# Patient Record
Sex: MALE | Race: BLACK OR AFRICAN AMERICAN | NOT HISPANIC OR LATINO | Employment: OTHER | URBAN - METROPOLITAN AREA
[De-identification: names, ages, dates, MRNs, and addresses within clinical notes are randomized per-mention and may not be internally consistent; named-entity substitution may affect disease eponyms.]

---

## 2017-05-27 PROCEDURE — 99283 EMERGENCY DEPT VISIT LOW MDM: CPT

## 2017-05-28 ENCOUNTER — HOSPITAL ENCOUNTER (EMERGENCY)
Facility: OTHER | Age: 57
Discharge: HOME | End: 2017-05-28
Attending: EMERGENCY MEDICINE
Payer: MEDICARE

## 2017-05-28 VITALS
WEIGHT: 218.5 LBS | RESPIRATION RATE: 18 BRPM | SYSTOLIC BLOOD PRESSURE: 192 MMHG | BODY MASS INDEX: 30.59 KG/M2 | TEMPERATURE: 98 F | OXYGEN SATURATION: 99 % | HEART RATE: 76 BPM | HEIGHT: 71 IN | DIASTOLIC BLOOD PRESSURE: 101 MMHG

## 2017-05-28 VITALS
SYSTOLIC BLOOD PRESSURE: 173 MMHG | DIASTOLIC BLOOD PRESSURE: 83 MMHG | HEART RATE: 66 BPM | RESPIRATION RATE: 18 BRPM | OXYGEN SATURATION: 100 % | TEMPERATURE: 98 F

## 2017-05-28 DIAGNOSIS — R07.89 ATYPICAL CHEST PAIN: Primary | ICD-10-CM

## 2017-05-28 DIAGNOSIS — Z99.2 ESRD ON DIALYSIS: ICD-10-CM

## 2017-05-28 DIAGNOSIS — G47.00 INSOMNIA, UNSPECIFIED TYPE: Primary | ICD-10-CM

## 2017-05-28 DIAGNOSIS — R07.9 CHEST PAIN: ICD-10-CM

## 2017-05-28 DIAGNOSIS — N18.6 ESRD ON DIALYSIS: ICD-10-CM

## 2017-05-28 LAB
ALBUMIN SERPL BCP-MCNC: 4.3 G/DL
ALP SERPL-CCNC: 137 U/L
ALT SERPL W/O P-5'-P-CCNC: 30 U/L
ANION GAP SERPL CALC-SCNC: 17 MMOL/L
AST SERPL-CCNC: 21 U/L
BASOPHILS # BLD AUTO: 0.01 K/UL
BASOPHILS NFR BLD: 0.2 %
BILIRUB SERPL-MCNC: 0.5 MG/DL
BUN SERPL-MCNC: 47 MG/DL
CALCIUM SERPL-MCNC: 8.2 MG/DL
CHLORIDE SERPL-SCNC: 100 MMOL/L
CO2 SERPL-SCNC: 20 MMOL/L
CREAT SERPL-MCNC: 12.8 MG/DL
DIFFERENTIAL METHOD: ABNORMAL
EOSINOPHIL # BLD AUTO: 0.2 K/UL
EOSINOPHIL NFR BLD: 4.1 %
ERYTHROCYTE [DISTWIDTH] IN BLOOD BY AUTOMATED COUNT: 13.8 %
EST. GFR  (AFRICAN AMERICAN): 5 ML/MIN/1.73 M^2
EST. GFR  (NON AFRICAN AMERICAN): 4 ML/MIN/1.73 M^2
GLUCOSE SERPL-MCNC: 103 MG/DL
HCT VFR BLD AUTO: 31.6 %
HGB BLD-MCNC: 10.9 G/DL
LYMPHOCYTES # BLD AUTO: 1.3 K/UL
LYMPHOCYTES NFR BLD: 26.2 %
MCH RBC QN AUTO: 30.2 PG
MCHC RBC AUTO-ENTMCNC: 34.5 %
MCV RBC AUTO: 88 FL
MONOCYTES # BLD AUTO: 0.3 K/UL
MONOCYTES NFR BLD: 5.9 %
NEUTROPHILS # BLD AUTO: 3.1 K/UL
NEUTROPHILS NFR BLD: 63.4 %
PLATELET # BLD AUTO: 194 K/UL
PMV BLD AUTO: 10 FL
POTASSIUM SERPL-SCNC: 4.5 MMOL/L
PROT SERPL-MCNC: 8.1 G/DL
RBC # BLD AUTO: 3.61 M/UL
SODIUM SERPL-SCNC: 137 MMOL/L
TROPONIN I SERPL DL<=0.01 NG/ML-MCNC: 0.1 NG/ML
TROPONIN I SERPL DL<=0.01 NG/ML-MCNC: 0.1 NG/ML
WBC # BLD AUTO: 4.88 K/UL

## 2017-05-28 PROCEDURE — 80053 COMPREHEN METABOLIC PANEL: CPT

## 2017-05-28 PROCEDURE — 85025 COMPLETE CBC W/AUTO DIFF WBC: CPT

## 2017-05-28 PROCEDURE — 93010 ELECTROCARDIOGRAM REPORT: CPT | Mod: ,,, | Performed by: INTERNAL MEDICINE

## 2017-05-28 PROCEDURE — 93005 ELECTROCARDIOGRAM TRACING: CPT

## 2017-05-28 PROCEDURE — 99284 EMERGENCY DEPT VISIT MOD MDM: CPT | Mod: 25

## 2017-05-28 PROCEDURE — 84484 ASSAY OF TROPONIN QUANT: CPT | Mod: 91

## 2017-05-28 NOTE — ED NOTES
Pt given d/c instructions to include home care and discussing sleeping problem with his PCP. Pt educated again on importance of developing good sleeping habits. Pt verbalized understanding and had no questions. Pt ambulated with steady gait to d/c window and d/c in stable condition.

## 2017-05-28 NOTE — ED NOTES
Pt sleeping, aroused easily by voice. Pt oriented upon arousal. Denies CP. EKG NSR. VS stable with non labored resp. No toiletry needs at present.

## 2017-05-28 NOTE — ED NOTES
"Pt presents to the ED with c/o "not sleeping in 24 hours" pt reports he went to sleep around 0430 in the morning and woke up around 930 in the morning. Pt reports he has poor sleeping habits and he stays up and watches TV all night and sleeps during day. Pt reports he is a dialysis pt and went to dialysis yesterday. Pt educated on developing good sleeping habits.   "

## 2017-05-28 NOTE — ED TRIAGE NOTES
Pt feels he is in fluid overload with L arm pain. Pt reports MWF dialysis. Pt reports he feels that he has 5L of fluid on him due to his weight. Pt reports elevated BP. Pt reports he spoke to Dr. Workman and was instructed to take another losartan and he took that at 708 this morning. Pt reports L arm pain since last night and denies any injury/truama. Pt denies any SOB or CP.

## 2017-05-28 NOTE — ED PROVIDER NOTES
"Encounter Date: 5/28/2017    SCRIBE #1 NOTE: I, Lucitayudy Orr, am scribing for, and in the presence of,  Dr. Larson I have scribed the entire note.       History     Chief Complaint   Patient presents with    Hypertension     pt reports being here last night for left arm pain and not being able to sleep; pt reports this morning his left arm still hurts and "my pressure is really high"; denies any chest pain     Review of patient's allergies indicates:   Allergen Reactions    Minoxidil Itching, Swelling and Other (See Comments)     " drops blood pressure."      Time seen by provider: 9:35 AM    This is a 56 y.o. male with HTN who presents with complaint of left arm pain that radiates toward the chest. He states that the pain began around 6 AM this morning. He did not sleep well last night, so he came to the ED, where he was told to turn off electronics and lights. The patient states that he still could not sleep until 3:30 AM. He slept until about 6:15 AM and woke up in a "hot sweat" and then L arm pain.  He notes that his pressure typically runs about 160/80 but was higher this morning. He is a dialysis patient. He reports that he feels like he "has extra fluid." He denies SOB, nausea, vomiting, diarrhea, fever, or chills. He had a full dialysis session on Friday with no issues.  His dry weight is 91 kg.        The history is provided by the patient.     Past Medical History:   Diagnosis Date    Cancer     RCC s/p bilateral nephrectomy    Cardiomegaly     Dialysis patient     Encounter for blood transfusion     Glaucoma     H/O colonoscopy     Hyperlipemia     Hypertension     Renal disorder      Past Surgical History:   Procedure Laterality Date    DIALYSIS FISTULA CREATION      in arm and groin on R side     KIDNEY SURGERY      KIDNEY TRANSPLANT  kidney cancer    NEPHRECTOMY Bilateral Right 2009;  Left 2010    RCC both sides     Family History   Problem Relation Age of Onset    Heart disease Mother " "    Heart disease Father     Diabetes Father     Hypertension Father      Social History   Substance Use Topics    Smoking status: Never Smoker    Smokeless tobacco: Not on file    Alcohol use No     Review of Systems   Constitutional: Positive for diaphoresis ("hot sweat"). Negative for chills and fever.   HENT: Negative for congestion, nosebleeds and sore throat.    Eyes: Negative for discharge and itching.   Respiratory: Negative for shortness of breath and wheezing.    Cardiovascular: Positive for chest pain.   Gastrointestinal: Negative for nausea.   Genitourinary: Negative for difficulty urinating and dysuria.   Musculoskeletal: Negative for back pain and neck pain.   Skin: Negative for rash.   Neurological: Positive for numbness ("Tingling" numbness). Negative for dizziness and weakness.   Hematological: Does not bruise/bleed easily.   Psychiatric/Behavioral: Negative for agitation and confusion.       Physical Exam     Initial Vitals [05/28/17 0919]   BP Pulse Resp Temp SpO2   (!) 194/100 75 18 98 °F (36.7 °C) 98 %     Physical Exam    Nursing note and vitals reviewed.  Constitutional: He appears well-developed and well-nourished. He is not diaphoretic. No distress.   HENT:   Head: Normocephalic and atraumatic.   Right Ear: External ear normal.   Left Ear: External ear normal.   Mouth/Throat: Oropharynx is clear and moist.   Eyes: Conjunctivae and EOM are normal. Pupils are equal, round, and reactive to light. Right eye exhibits no discharge. Left eye exhibits no discharge.   Neck: Normal range of motion. Neck supple. No JVD present.   Cardiovascular: Normal rate, regular rhythm, normal heart sounds and intact distal pulses.   2+DP/PT pulses bilaterally   Pulmonary/Chest: Breath sounds normal. No respiratory distress. He has no wheezes. He has no rhonchi. He has no rales.   Abdominal: Soft. Bowel sounds are normal. He exhibits no distension. There is no tenderness. There is no rebound and no guarding. "   Musculoskeletal: Normal range of motion. He exhibits edema. He exhibits no tenderness.   RLE fistula with palpable thrill. Peripheral edema noted.     Neurological: He is alert and oriented to person, place, and time. He has normal strength. No sensory deficit.   Skin: Skin is warm and dry. No rash noted.         ED Course   Procedures  Labs Reviewed   CBC W/ AUTO DIFFERENTIAL - Abnormal; Notable for the following:        Result Value    RBC 3.61 (*)     Hemoglobin 10.9 (*)     Hematocrit 31.6 (*)     All other components within normal limits   COMPREHENSIVE METABOLIC PANEL - Abnormal; Notable for the following:     CO2 20 (*)     BUN, Bld 47 (*)     Creatinine 12.8 (*)     Calcium 8.2 (*)     Alkaline Phosphatase 137 (*)     Anion Gap 17 (*)     eGFR if  5 (*)     eGFR if non  4 (*)     All other components within normal limits   TROPONIN I - Abnormal; Notable for the following:     Troponin I 0.100 (*)     All other components within normal limits   TROPONIN I - Abnormal; Notable for the following:     Troponin I 0.095 (*)     All other components within normal limits     EKG Readings: (Independently Interpreted)   9:47AM:  Rate of 69.  NSR.  L axis deviation.  LVH.  Normal intervals.  No ST or ischemic changes.           X-Rays:   Independently Interpreted Readings:   Chest X-Ray: 12:00 PM - Enlarged heart. Increased interstitial markings. Otherwise, no infiltrate, effusion, or edema.      Imaging Results          X-Ray Chest PA And Lateral (Final result)  Result time 05/28/17 10:05:58    Final result by Sariah Santana MD (05/28/17 10:05:58)                 Impression:      Suspected mild vascular congestive changes as above.              Electronically signed by: SARIAH SANTANA MD  Date:     05/28/17  Time:    10:05              Narrative:    Comparison: 12/3/2011    Technique: PA and lateral views of the chest was obtained    Findings: The cardiac silhouette is enlarged.    Pulmonary vasculature appears mildly congested without evidence of perico pulmonary edema.  No parenchymal consolidations or pleural effusions visualized. Visualized osseous structures demonstrate no acute abnormality.                              Medical Decision Making:   History:   Old Medical Records: I decided to obtain old medical records.  Old Records Summarized: records from clinic visits, other records and records from previous admission(s).  Initial Assessment:   9:35AM:  Pt is a 57 y/o M who presents to ED with elevated BP along with L arm pain radiating to his chest.  Pt appears well, nontoxic.  He does have ESRD, HTN, HLD.  Will plan for labs, EKG, CXR, will continue to follow and reassess.    Independently Interpreted Test(s):   I have ordered and independently interpreted X-rays - see prior notes.  I have ordered and independently interpreted EKG Reading(s) - see prior notes  Clinical Tests:   Lab Tests: Ordered and Reviewed  Radiological Study: Ordered and Reviewed  Medical Tests: Ordered and Reviewed    Additional MDM:   EKG: I have independently interpreted EKG(s) - see notes.   X-Rays: I have independently interpreted X-Ray(s) - see notes.     1:32 PM:  Pt doing well.  His troponin was slightly elevated initially, though I suspect that this is likely due to her ESRD, but his repeat troponin remains stable.  I have a low suspicion that this is cardiac in nature and based on his labs and CXR, he does not need to be emergently dialyzed at this time and can wait till tomorrow.  I counseled pt regarding supportive care measures.  I have discussed with the pt ED return warnings and need for close PCP f/u.  Pt agreeable to plan and all questions answered.  I feel that pt is stable for discharge and management as an outpatient and no further intervention is needed at this time.  Pt is comfortable returning to the ED if needed.  Will DC home in stable condition.              Scribe Attestation:   Scribe  #1: I performed the above scribed service and the documentation accurately describes the services I performed. I attest to the accuracy of the note.    Attending Attestation:           Physician Attestation for Scribe:  Physician Attestation Statement for Scribe #1: I, Dr. Simmons, reviewed documentation, as scribed by Lucita Orr in my presence, and it is both accurate and complete.                 ED Course     Clinical Impression:     1. Atypical chest pain    2. Chest pain    3. ESRD on dialysis                Shelly Simmons MD  05/28/17 1533       Shelly Simmons MD  05/28/17 1535

## 2017-05-28 NOTE — DISCHARGE INSTRUCTIONS
Follow up with Dr. Workman.    Please return to the ER if you have chest pain, difficulty breathing, fevers, altered mental status, dizziness, weakness, or any other concerns.      Follow up with your primary care physician.

## 2017-05-28 NOTE — ED PROVIDER NOTES
"Encounter Date: 5/27/2017    SCRIBE #1 NOTE: I, Hodan Fink , am scribing for, and in the presence of, Dr. Navarrete.       History     Chief Complaint   Patient presents with    Insomnia     pt reports he hasn't slept in 2 nights, reported he only took Tylenol and "doesn't take that other stuff", and also reports "carpa tunnel" again to left forearm     Review of patient's allergies indicates:   Allergen Reactions    Minoxidil Itching, Swelling and Other (See Comments)     " drops blood pressure."      Time seen by provider: 12:15 AM    This is a 56 y.o. male who presents with complaint of insomnia. Symptoms began two days ago. He stays awake throughout the night watching television. Pt has no other complaints, but denies fever, chills, nausea, vomiting, abdominal pain, myalgias, or weakness. Symptoms are described as constant. Pt is able to sleep during the day, and denies any alleviating factors. He previously followed up with PCP for the same symptoms. Pt is dialyzed every Monday, Wednesday, and Friday. He denies use of tobacco, alcohol, or illicit drugs.         The history is provided by the patient.     Past Medical History:   Diagnosis Date    Cancer     RCC s/p bilateral nephrectomy    Cardiomegaly     Dialysis patient     Encounter for blood transfusion     Glaucoma     H/O colonoscopy     Hyperlipemia     Hypertension     Renal disorder      Past Surgical History:   Procedure Laterality Date    DIALYSIS FISTULA CREATION      in arm and groin on R side     KIDNEY SURGERY      KIDNEY TRANSPLANT  kidney cancer    NEPHRECTOMY Bilateral Right 2009;  Left 2010    RCC both sides     Family History   Problem Relation Age of Onset    Heart disease Mother     Heart disease Father     Diabetes Father     Hypertension Father      Social History   Substance Use Topics    Smoking status: Never Smoker    Smokeless tobacco: Not on file    Alcohol use No     Review of Systems   Constitutional: Negative " for fever.   HENT: Negative for sore throat.    Respiratory: Negative for shortness of breath.    Cardiovascular: Negative for chest pain.   Gastrointestinal: Negative for nausea.   Genitourinary: Negative for dysuria.   Musculoskeletal: Negative for back pain.   Skin: Negative for rash.   Neurological: Negative for weakness.   Hematological: Does not bruise/bleed easily.   Psychiatric/Behavioral: Negative for sleep disturbance.        Positive for insomnia.        Physical Exam     Initial Vitals [05/28/17 0003]   BP Pulse Resp Temp SpO2   (!) 192/101 76 18 98.1 °F (36.7 °C) 99 %     Physical Exam    Nursing note and vitals reviewed.  Constitutional: He appears well-developed and well-nourished. He is not diaphoretic. No distress.   HENT:   Head: Normocephalic and atraumatic.   Right Ear: External ear normal.   Left Ear: External ear normal.   Eyes: EOM are normal. Pupils are equal, round, and reactive to light. Right eye exhibits no discharge. Left eye exhibits no discharge.   Neck: Normal range of motion.   Cardiovascular: Normal rate, regular rhythm and normal heart sounds. Exam reveals no gallop and no friction rub.    No murmur heard.  Pulmonary/Chest: Breath sounds normal. No respiratory distress. He has no wheezes. He has no rhonchi. He has no rales.   Abdominal: Soft. There is no tenderness. There is no rebound and no guarding.   Musculoskeletal: Normal range of motion. He exhibits no edema or tenderness.   Neurological: He is alert and oriented to person, place, and time.   Cranial nerves II through XII grossly intact.  5/5 motor strength all 4 extremities.  Sensation is normal.  Finger to nose normal.  Gait normal.  Speech and cognition is normal.  No focal neurologic deficit.   Skin: Skin is warm and dry. No rash and no abscess noted. No erythema. No pallor.   Psychiatric: He has a normal mood and affect. His behavior is normal. Judgment and thought content normal.         ED Course   Procedures  Labs  Reviewed - No data to display          Medical Decision Making:   ED Management:  Patient presents complaining his having difficulty sleeping for 2 nights in a row.  Denies any significant stress other than some plumbing problems he's been having at his house.  Denies any medical issues.  Has been compliant with dialysis.  He feels physically fine.  He reports he does try to get asleep watching television.  I counseled him on appropriate sleep hygiene.  He reports he previously down benefit from a pharmaceutical sleep aid.  I recommended he follow-up with primary care discuss with him the appropriate to receive this again.  No signs of any suicidality or homicidality or anything else.     I did have an extensive talk regarding signs to return for and need for follow up. Patient expressed understanding and will monitor symptoms closely and follow-up as needed.    ROSALBA Navarrete M.D.  05/28/2017  1:12 AM              Scribe Attestation:   Scribe #1: I performed the above scribed service and the documentation accurately describes the services I performed. I attest to the accuracy of the note.    Attending Attestation:           Physician Attestation for Scribe:  Physician Attestation Statement for Scribe #1: I, Dr. Navarrete, reviewed documentation, as scribed by Hodan Fink  in my presence, and it is both accurate and complete.                 ED Course     Clinical Impression:     1. Insomnia, unspecified type                Andrea Navarrete MD  05/28/17 0112

## 2017-05-29 ENCOUNTER — HOSPITAL ENCOUNTER (EMERGENCY)
Facility: OTHER | Age: 57
Discharge: HOME | End: 2017-05-29
Attending: EMERGENCY MEDICINE
Payer: MEDICARE

## 2017-05-29 VITALS
WEIGHT: 220 LBS | SYSTOLIC BLOOD PRESSURE: 155 MMHG | DIASTOLIC BLOOD PRESSURE: 78 MMHG | RESPIRATION RATE: 16 BRPM | HEART RATE: 80 BPM | HEIGHT: 71 IN | BODY MASS INDEX: 30.8 KG/M2 | OXYGEN SATURATION: 98 % | TEMPERATURE: 98 F

## 2017-05-29 DIAGNOSIS — F32.A DEPRESSION, UNSPECIFIED DEPRESSION TYPE: Primary | ICD-10-CM

## 2017-05-29 PROCEDURE — 99283 EMERGENCY DEPT VISIT LOW MDM: CPT

## 2017-05-29 NOTE — ED PROVIDER NOTES
"Encounter Date: 5/29/2017    SCRIBE #1 NOTE: I, Lucita Orr, am scribing for, and in the presence of,  Dr. Larson I have scribed the entire note.       History     Chief Complaint   Patient presents with    Depression     Pt is crying and reports feelings of depression since yesterday. "When I get around a lot of people, I'm crying." Dialyzed today. Headache from crying so much. Denies SI/HI. Denies chest pain, SOB.      Review of patient's allergies indicates:   Allergen Reactions    Minoxidil Itching, Swelling and Other (See Comments)     " drops blood pressure."      Time seen by provider: 3:24 PM    This is a 56 y.o. male dialysis patient who presents with complaint of depression. He states that he has been struggling with depression for many years, but he has not seen a doctor about the depression since 2005. The patient states that his head sometimes feels "tight," which he says makes him depressed, but he feels better when his blood pressure is not high. He denies SI or HI, and he states that he feels safe going home. He also denies numbness, weakness, SOB, chest pain, nausea, vomiting, diarrhea, fever, or chills. His wife is a , but she has advised him to talk to other providers. He notes that he will be seeing a  tomorrow. He reports that eating a poor diet caused pain that "went to the brain" and that the "fluid came on" him, but he began eating a better diet starting today.  He did get dialyzed today and had fluid removed.   He reports that he was concerned about his blood pressure and wanted to get it checked in the ED.      The history is provided by the patient.     Past Medical History:   Diagnosis Date    Cancer     RCC s/p bilateral nephrectomy    Cardiomegaly     Dialysis patient     Encounter for blood transfusion     Glaucoma     H/O colonoscopy     Hyperlipemia     Hypertension     Renal disorder      Past Surgical History:   Procedure Laterality Date    " DIALYSIS FISTULA CREATION      in arm and groin on R side     KIDNEY SURGERY      KIDNEY TRANSPLANT  kidney cancer    NEPHRECTOMY Bilateral Right 2009;  Left 2010    RCC both sides     Family History   Problem Relation Age of Onset    Heart disease Mother     Heart disease Father     Diabetes Father     Hypertension Father      Social History   Substance Use Topics    Smoking status: Never Smoker    Smokeless tobacco: Not on file    Alcohol use No     Review of Systems   Constitutional: Negative for chills and fever.   HENT: Negative for congestion, nosebleeds and sore throat.    Eyes: Negative for discharge and itching.   Respiratory: Negative for shortness of breath and wheezing.    Cardiovascular: Negative for chest pain.   Gastrointestinal: Negative for diarrhea, nausea and vomiting.   Musculoskeletal: Negative for back pain and neck pain.   Skin: Negative for rash.   Neurological: Negative for dizziness, weakness, numbness and headaches.   Hematological: Does not bruise/bleed easily.   Psychiatric/Behavioral: Positive for dysphoric mood. Negative for hallucinations, self-injury and suicidal ideas.       Physical Exam     Initial Vitals [05/29/17 1502]   BP Pulse Resp Temp SpO2   (!) 158/81 80 16 98.3 °F (36.8 °C) 98 %     Physical Exam    Nursing note and vitals reviewed.  Constitutional: Vital signs are normal. He appears well-developed and well-nourished. He is not diaphoretic. He does not appear ill. No distress.   HENT:   Head: Normocephalic and atraumatic.   Right Ear: External ear normal.   Left Ear: External ear normal.   Mouth/Throat: Oropharynx is clear and moist.   Eyes: Conjunctivae and EOM are normal. Right eye exhibits no discharge. Left eye exhibits no discharge.   Neck: Normal range of motion. Neck supple.   Cardiovascular: Normal rate, regular rhythm, S1 normal, S2 normal, normal heart sounds and intact distal pulses.   Pulmonary/Chest: Breath sounds normal. No respiratory distress. He  has no wheezes. He has no rhonchi. He has no rales.   Musculoskeletal: Normal range of motion. He exhibits no edema or tenderness.   Neurological: He is alert and oriented to person, place, and time. He has normal strength. No sensory deficit. He exhibits normal muscle tone. Gait normal.   Skin: Skin is warm and dry. No rash noted.   Psychiatric: Judgment and thought content normal.   Tearful. Good judgment, good insight. Not homicidal. Not suicidal.   No AVH.  Appropriate thought content, normal speech.           ED Course   Procedures  Labs Reviewed - No data to display          Medical Decision Making:   History:   Old Medical Records: I decided to obtain old medical records.  Old Records Summarized: other records and records from clinic visits.  Initial Assessment:   3:24PM:  Pt is a 57 y/o M who presents to ED with depression.  Pt was also concerned about his blood pressure as he admits to a poor diet over the weekend. Pt adamantly denies SI/HI, also is going to talk to a SW tomorrow and his wife is a SW as well.  Pt does feel safe going home to his wife and does agree that he would return if he actually had thoughts of hurting himself or others.  Pt would like to go home and declines any further evaluation at this time, and feels better after getting his BP checked.  I feel that pt is safe to go home and does not require PEC at this time.  He does have good f/u and also follows closely with Dr. Workman.  I counseled pt regarding supportive care measures.  I have discussed with the pt ED return warnings and need for close PCP f/u.  Pt agreeable to plan and all questions answered.  I feel that pt is stable for discharge and management as an outpatient and no further intervention is needed at this time.  Pt is comfortable returning to the ED if needed.  Will DC home in stable condition.                Scribe Attestation:   Scribe #1: I performed the above scribed service and the documentation accurately describes  the services I performed. I attest to the accuracy of the note.    Attending Attestation:           Physician Attestation for Scribe:  Physician Attestation Statement for Scribe #1: I, Dr. Simmons, reviewed documentation, as scribed by Lucita Orr in my presence, and it is both accurate and complete.                 ED Course     Clinical Impression:     1. Depression, unspecified depression type                Shelly Simmons MD  05/29/17 5471

## 2017-05-29 NOTE — ED NOTES
Pt to er with c/o feeling depressed. Dr wallace at bedside. Pt aaox3 eye red. Pt states feels comfortable going  Home. Skin warm and dry. Pt affect appropriate. Pt gait steady.

## 2017-08-01 ENCOUNTER — HOSPITAL ENCOUNTER (EMERGENCY)
Facility: OTHER | Age: 57
Discharge: HOME | End: 2017-08-01
Attending: EMERGENCY MEDICINE
Payer: MEDICARE

## 2017-08-01 VITALS
HEART RATE: 76 BPM | TEMPERATURE: 98 F | DIASTOLIC BLOOD PRESSURE: 93 MMHG | SYSTOLIC BLOOD PRESSURE: 158 MMHG | RESPIRATION RATE: 18 BRPM | BODY MASS INDEX: 30.09 KG/M2 | OXYGEN SATURATION: 99 % | HEIGHT: 71 IN | WEIGHT: 214.94 LBS

## 2017-08-01 DIAGNOSIS — S40.022A CONTUSION OF LEFT ARM, INITIAL ENCOUNTER: ICD-10-CM

## 2017-08-01 DIAGNOSIS — S09.90XA HEAD INJURY, INITIAL ENCOUNTER: Primary | ICD-10-CM

## 2017-08-01 DIAGNOSIS — S00.03XA SCALP CONTUSION: ICD-10-CM
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
## 2017-08-01 PROCEDURE — 99283 EMERGENCY DEPT VISIT LOW MDM: CPT

## 2017-08-01 RX ORDER — AMLODIPINE BESYLATE 10 MG/1
10 TABLET ORAL DAILY
COMMUNITY
End: 2023-04-18 | Stop reason: SDUPTHER

## 2017-08-01 NOTE — ED PROVIDER NOTES
"Encounter Date: 8/1/2017       History     Chief Complaint   Patient presents with    Head Injury     Pt fell between the bed and dresser while getting out of bed this AM, CO left arm pain, and right head pain with swelling. Denies LOC.     56-year-old male with history of renal cell carcinoma status post bilateral nephrectomy on dialysis as well as glaucoma, hyperlipidemia, hypertension presents to the emergency department status post fall.  He states that he was having sexual intercourse with his wife when he attempted to disengage when he fell off of his bed around 6 AM.  He states that he struck his head either on the dresser, floor or his bed.  He denies LOC, confusion, nausea, vomiting, lightheadedness, dizziness, numbness, weakness, headache.  He admits to taking 2 Tylenol and applying ice to the contusion to his left forearm.  He also reports contusion to the right scalp.  He states that his wife was concerned so he came to the emergency department for further evaluation and treatment.      The history is provided by the patient.     Review of patient's allergies indicates:   Allergen Reactions    Minoxidil Itching, Swelling and Other (See Comments)     " drops blood pressure."      Past Medical History:   Diagnosis Date    Cancer     RCC s/p bilateral nephrectomy    Cardiomegaly     Dialysis patient     Encounter for blood transfusion     Glaucoma     H/O colonoscopy     Hyperlipemia     Hypertension     Renal disorder      Past Surgical History:   Procedure Laterality Date    DIALYSIS FISTULA CREATION      in arm and groin on R side     KIDNEY SURGERY      KIDNEY TRANSPLANT  kidney cancer    NEPHRECTOMY Bilateral Right 2009;  Left 2010    RCC both sides     Family History   Problem Relation Age of Onset    Heart disease Mother     Heart disease Father     Diabetes Father     Hypertension Father      Social History   Substance Use Topics    Smoking status: Never Smoker    Smokeless " tobacco: Never Used    Alcohol use No     Review of Systems   Constitutional: Negative for chills and fever.   HENT: Negative for sore throat.    Eyes: Negative for photophobia and visual disturbance.   Respiratory: Negative for shortness of breath.    Cardiovascular: Negative for chest pain.   Gastrointestinal: Negative for nausea and vomiting.   Genitourinary: Negative for dysuria.   Musculoskeletal: Negative for back pain, neck pain and neck stiffness.        Contusion left forearm and scalp   Skin: Negative for rash.   Neurological: Negative for weakness and headaches.   Hematological: Does not bruise/bleed easily.   Psychiatric/Behavioral: Negative for confusion.       Physical Exam     Initial Vitals [08/01/17 0826]   BP Pulse Resp Temp SpO2   (!) 158/93 76 18 97.6 °F (36.4 °C) 99 %      MAP       114.67         Physical Exam    Nursing note and vitals reviewed.  Constitutional: He appears well-developed and well-nourished. He is not diaphoretic.  Non-toxic appearance. No distress.   HENT:   Head: Normocephalic. Head is with contusion. Head is without raccoon's eyes, without Schneider's sign, without abrasion and without laceration.       Right Ear: External ear normal.   Left Ear: External ear normal.   Nose: Nose normal. No nasal deformity, septal deviation or nasal septal hematoma.   Mouth/Throat: Uvula is midline, oropharynx is clear and moist and mucous membranes are normal.   Eyes: Conjunctivae and lids are normal. Pupils are equal, round, and reactive to light. Right conjunctiva is not injected. Right conjunctiva has no hemorrhage. Left conjunctiva is not injected. Left conjunctiva has no hemorrhage. No scleral icterus. Right eye exhibits normal extraocular motion and no nystagmus. Left eye exhibits normal extraocular motion and no nystagmus. Right pupil is round. Left pupil is round. Pupils are equal.   Neck: Normal range of motion and phonation normal. Neck supple. No spinous process tenderness and no  muscular tenderness present. Normal range of motion present. No neck rigidity.   Cardiovascular: Normal rate, regular rhythm, normal heart sounds, intact distal pulses and normal pulses. Exam reveals no gallop, no friction rub and no decreased pulses.    No murmur heard.  Pulses:       Radial pulses are 2+ on the right side, and 2+ on the left side.        Dorsalis pedis pulses are 2+ on the right side, and 2+ on the left side.   Pulmonary/Chest: Effort normal and breath sounds normal. No respiratory distress. He has no decreased breath sounds. He has no wheezes. He has no rhonchi. He has no rales.   Abdominal: Soft. Normal appearance and bowel sounds are normal. There is no tenderness. There is no rebound and no guarding.   Musculoskeletal: Normal range of motion.   No obvious deformities, moving all extremities, normal gait  Midline tenderness palpation or step-offs of the cervical, thoracic or lumbar spine.  Intact distal pulses and no sensory deficits.  Capillary refill less than 3 seconds.  No bony deformity or bony tenderness palpation.  Contusion noted to the left forearm with small overlying abrasion.  Full range of motion of the left wrist and elbow.   strength 5 out of 5.  Contusion noted to the right scalp with no bony tenderness palpation or crepitus.  Strength 5 out of 5 bilateral upper and lower extremities.  Full range of motion of extremities.   Neurological: He is alert and oriented to person, place, and time. He has normal strength and normal reflexes. He displays no atrophy. No cranial nerve deficit or sensory deficit. He exhibits normal muscle tone. Coordination and gait normal. GCS eye subscore is 4. GCS verbal subscore is 5. GCS motor subscore is 6.   Negative pronator drift, normal rapid movements   Skin: Skin is warm and dry. Abrasion noted. No bruising, no ecchymosis, no laceration, no lesion and no rash noted. No erythema.        Fistula to the right thigh with palpable thrill    Psychiatric: He has a normal mood and affect. His speech is normal and behavior is normal. Judgment normal. Cognition and memory are normal.         ED Course   Procedures  Labs Reviewed - No data to display          Medical Decision Making:   History:   Old Medical Records: I decided to obtain old medical records.  Initial Assessment:   56-year-old male with complaints consistent with contusion to the scalp and left forearm status post fall from bed.  Afebrile neurovascular intact.  He is alert, healthy and nontoxic appearing.  He is in no apparent distress.  No focal neurological deficits.  No signs of fracture dislocation.  Contusion noted to the right scalp and left forearm  ED Management:  I do not suspect intracranial hemorrhage, mass or lesion at this time.  I doubt concussion.  Feel that emergent imaging is indicated at this time.  Patient given strict return precautions for any worsening symptoms.  He states understanding.  He is stable will be discharged home with care instructions.  He is to follow-up with his doctor the next 48 hours or return for any worsening signs or symptoms.  He states understanding.  This patient was discussed with the attending physician who agrees with treatment plan  Other:   I have discussed this case with another health care provider.       <> Summary of the Discussion: Franco  This note was created using Dragon Medical dictation.  There may be typographical errors secondary to dictation.                     ED Course     Clinical Impression:     1. Head injury, initial encounter    2. Scalp contusion    3. Contusion of left arm, initial encounter    4. Fall, initial encounter          Disposition:   Disposition: Discharged  Condition: Stable                        Linda Britton PA-C  08/01/17 0931

## 2017-08-01 NOTE — ED NOTES
"Pt reports falling between the dresser and his bed this morning after "making love to my wife." Has hematoma to right scalp. Takes baby ASA daily. Took 2 Tylenol today before coming to ER. Denies LOC, HA. Ambulatory. Also has hematoma to left forearm. Palpable radial pulse present. NAD. Will cont to monitor.  "

## 2017-08-14 PROBLEM — E66.9 MILD OBESITY: Status: ACTIVE | Noted: 2017-08-14

## 2021-01-14 ENCOUNTER — IMMUNIZATION (OUTPATIENT)
Dept: INTERNAL MEDICINE | Facility: CLINIC | Age: 61
End: 2021-01-14
Payer: MEDICARE

## 2021-01-14 DIAGNOSIS — Z23 NEED FOR VACCINATION: ICD-10-CM

## 2021-01-14 PROCEDURE — 91300 COVID-19, MRNA, LNP-S, PF, 30 MCG/0.3 ML DOSE VACCINE: CPT | Mod: PBBFAC

## 2021-02-04 ENCOUNTER — IMMUNIZATION (OUTPATIENT)
Dept: INTERNAL MEDICINE | Facility: CLINIC | Age: 61
End: 2021-02-04
Payer: MEDICARE

## 2021-02-04 DIAGNOSIS — Z23 NEED FOR VACCINATION: Primary | ICD-10-CM

## 2021-02-04 PROCEDURE — 91300 COVID-19, MRNA, LNP-S, PF, 30 MCG/0.3 ML DOSE VACCINE: CPT | Mod: PBBFAC

## 2021-02-04 PROCEDURE — 0002A COVID-19, MRNA, LNP-S, PF, 30 MCG/0.3 ML DOSE VACCINE: CPT | Mod: PBBFAC

## 2021-03-18 ENCOUNTER — PATIENT MESSAGE (OUTPATIENT)
Dept: RESEARCH | Facility: HOSPITAL | Age: 61
End: 2021-03-18

## 2021-03-26 ENCOUNTER — PATIENT MESSAGE (OUTPATIENT)
Dept: RESEARCH | Facility: HOSPITAL | Age: 61
End: 2021-03-26

## 2021-04-19 ENCOUNTER — OFFICE VISIT (OUTPATIENT)
Dept: CARDIOLOGY | Facility: CLINIC | Age: 61
End: 2021-04-19
Attending: INTERNAL MEDICINE
Payer: MEDICARE

## 2021-04-19 VITALS
WEIGHT: 205.5 LBS | HEIGHT: 71 IN | BODY MASS INDEX: 28.77 KG/M2 | SYSTOLIC BLOOD PRESSURE: 127 MMHG | DIASTOLIC BLOOD PRESSURE: 59 MMHG | HEART RATE: 61 BPM

## 2021-04-19 DIAGNOSIS — N18.6 END STAGE KIDNEY DISEASE: ICD-10-CM

## 2021-04-19 DIAGNOSIS — E78.00 HYPERCHOLESTEROLEMIA: ICD-10-CM

## 2021-04-19 DIAGNOSIS — E66.3 OVERWEIGHT: ICD-10-CM

## 2021-04-19 DIAGNOSIS — I10 ESSENTIAL HYPERTENSION: ICD-10-CM

## 2021-04-19 PROCEDURE — 93010 ELECTROCARDIOGRAM REPORT: CPT | Mod: ,,, | Performed by: INTERNAL MEDICINE

## 2021-04-19 PROCEDURE — 99999 PR PBB SHADOW E&M-EST. PATIENT-LVL III: CPT | Mod: PBBFAC,,, | Performed by: INTERNAL MEDICINE

## 2021-04-19 PROCEDURE — 93005 ELECTROCARDIOGRAM TRACING: CPT

## 2021-04-19 PROCEDURE — 99999 PR PBB SHADOW E&M-EST. PATIENT-LVL III: ICD-10-PCS | Mod: PBBFAC,,, | Performed by: INTERNAL MEDICINE

## 2021-04-19 PROCEDURE — 99214 PR OFFICE/OUTPT VISIT, EST, LEVL IV, 30-39 MIN: ICD-10-PCS | Mod: S$PBB,25,, | Performed by: INTERNAL MEDICINE

## 2021-04-19 PROCEDURE — 93005 ELECTROCARDIOGRAM TRACING: CPT | Mod: PBBFAC | Performed by: INTERNAL MEDICINE

## 2021-04-19 PROCEDURE — 99213 OFFICE O/P EST LOW 20 MIN: CPT | Mod: PBBFAC,25 | Performed by: INTERNAL MEDICINE

## 2021-04-19 PROCEDURE — 93010 EKG 12-LEAD: ICD-10-PCS | Mod: ,,, | Performed by: INTERNAL MEDICINE

## 2021-04-19 PROCEDURE — 99214 OFFICE O/P EST MOD 30 MIN: CPT | Mod: S$PBB,25,, | Performed by: INTERNAL MEDICINE

## 2021-10-19 ENCOUNTER — OFFICE VISIT (OUTPATIENT)
Dept: CARDIOLOGY | Facility: CLINIC | Age: 61
End: 2021-10-19
Attending: INTERNAL MEDICINE
Payer: MEDICARE

## 2021-10-19 VITALS
HEART RATE: 55 BPM | WEIGHT: 207.56 LBS | OXYGEN SATURATION: 99 % | SYSTOLIC BLOOD PRESSURE: 136 MMHG | DIASTOLIC BLOOD PRESSURE: 84 MMHG | BODY MASS INDEX: 29.06 KG/M2 | HEIGHT: 71 IN

## 2021-10-19 DIAGNOSIS — Z99.2 ANEMIA IN CHRONIC KIDNEY DISEASE, ON CHRONIC DIALYSIS: ICD-10-CM

## 2021-10-19 DIAGNOSIS — E66.3 OVERWEIGHT: ICD-10-CM

## 2021-10-19 DIAGNOSIS — D63.1 ANEMIA IN CHRONIC KIDNEY DISEASE, ON CHRONIC DIALYSIS: ICD-10-CM

## 2021-10-19 DIAGNOSIS — E78.00 HYPERCHOLESTEROLEMIA: ICD-10-CM

## 2021-10-19 DIAGNOSIS — I15.0 RENOVASCULAR HYPERTENSION: ICD-10-CM

## 2021-10-19 DIAGNOSIS — N18.6 END STAGE KIDNEY DISEASE: ICD-10-CM

## 2021-10-19 DIAGNOSIS — N18.6 ANEMIA IN CHRONIC KIDNEY DISEASE, ON CHRONIC DIALYSIS: ICD-10-CM

## 2021-10-19 PROCEDURE — 99214 OFFICE O/P EST MOD 30 MIN: CPT | Mod: S$PBB,,, | Performed by: INTERNAL MEDICINE

## 2021-10-19 PROCEDURE — 99214 PR OFFICE/OUTPT VISIT, EST, LEVL IV, 30-39 MIN: ICD-10-PCS | Mod: S$PBB,,, | Performed by: INTERNAL MEDICINE

## 2021-10-19 PROCEDURE — 99999 PR PBB SHADOW E&M-EST. PATIENT-LVL III: ICD-10-PCS | Mod: PBBFAC,,, | Performed by: INTERNAL MEDICINE

## 2021-10-19 PROCEDURE — 99213 OFFICE O/P EST LOW 20 MIN: CPT | Mod: PBBFAC | Performed by: INTERNAL MEDICINE

## 2021-10-19 PROCEDURE — 99999 PR PBB SHADOW E&M-EST. PATIENT-LVL III: CPT | Mod: PBBFAC,,, | Performed by: INTERNAL MEDICINE

## 2021-10-19 RX ORDER — CLOPIDOGREL BISULFATE 75 MG/1
75 TABLET ORAL DAILY
COMMUNITY
End: 2023-04-18

## 2021-12-08 ENCOUNTER — IMMUNIZATION (OUTPATIENT)
Dept: INTERNAL MEDICINE | Facility: CLINIC | Age: 61
End: 2021-12-08
Payer: MEDICARE

## 2021-12-08 DIAGNOSIS — Z23 NEED FOR VACCINATION: Primary | ICD-10-CM

## 2021-12-08 PROCEDURE — 0004A COVID-19, MRNA, LNP-S, PF, 30 MCG/0.3 ML DOSE VACCINE: CPT | Mod: PBBFAC

## 2022-04-19 ENCOUNTER — OFFICE VISIT (OUTPATIENT)
Dept: CARDIOLOGY | Facility: CLINIC | Age: 62
End: 2022-04-19
Attending: INTERNAL MEDICINE
Payer: MEDICARE

## 2022-04-19 VITALS
DIASTOLIC BLOOD PRESSURE: 68 MMHG | SYSTOLIC BLOOD PRESSURE: 116 MMHG | OXYGEN SATURATION: 96 % | HEART RATE: 60 BPM | WEIGHT: 204.38 LBS | BODY MASS INDEX: 28.61 KG/M2 | HEIGHT: 71 IN

## 2022-04-19 DIAGNOSIS — E78.00 HYPERCHOLESTEROLEMIA: ICD-10-CM

## 2022-04-19 DIAGNOSIS — E66.3 OVERWEIGHT: ICD-10-CM

## 2022-04-19 DIAGNOSIS — N18.6 END STAGE KIDNEY DISEASE: ICD-10-CM

## 2022-04-19 DIAGNOSIS — I15.0 RENOVASCULAR HYPERTENSION: ICD-10-CM

## 2022-04-19 PROCEDURE — 93005 ELECTROCARDIOGRAM TRACING: CPT

## 2022-04-19 PROCEDURE — 99213 OFFICE O/P EST LOW 20 MIN: CPT | Mod: 25,S$PBB,, | Performed by: INTERNAL MEDICINE

## 2022-04-19 PROCEDURE — 93005 ELECTROCARDIOGRAM TRACING: CPT | Mod: PBBFAC | Performed by: INTERNAL MEDICINE

## 2022-04-19 PROCEDURE — 99999 PR PBB SHADOW E&M-EST. PATIENT-LVL III: CPT | Mod: PBBFAC,,, | Performed by: INTERNAL MEDICINE

## 2022-04-19 PROCEDURE — 99213 PR OFFICE/OUTPT VISIT, EST, LEVL III, 20-29 MIN: ICD-10-PCS | Mod: 25,S$PBB,, | Performed by: INTERNAL MEDICINE

## 2022-04-19 PROCEDURE — 93010 EKG 12-LEAD: ICD-10-PCS | Mod: ,,, | Performed by: INTERNAL MEDICINE

## 2022-04-19 PROCEDURE — 99213 OFFICE O/P EST LOW 20 MIN: CPT | Mod: PBBFAC | Performed by: INTERNAL MEDICINE

## 2022-04-19 PROCEDURE — 99999 PR PBB SHADOW E&M-EST. PATIENT-LVL III: ICD-10-PCS | Mod: PBBFAC,,, | Performed by: INTERNAL MEDICINE

## 2022-04-19 PROCEDURE — 93010 ELECTROCARDIOGRAM REPORT: CPT | Mod: ,,, | Performed by: INTERNAL MEDICINE

## 2022-04-19 NOTE — PROGRESS NOTES
Subjective:     Glenn Collier is a 61 y.o. male with hypertension and hypercholesterolemia. He is overweight. He developed end stage renal disease due to hypertension in 1997 and then began hemodialysis. Access is a graft in the right thigh. He has undergone bilateral nephrectomies. He had a large pericardial effusion in 2006 that resolved with intensified dialysis. He receives erythropoietin shots about every two weeks. He denies any exertional chest pain or exertional dyspnea. No palpitations or weak spells. No bleeding. No issues with any of his prescribed medications. Feeling well overall.      Hypertension  This is a chronic problem. The current episode started more than 1 year ago. The problem is unchanged. The problem is controlled (usually 120-130/60-80 mmHg at home). Pertinent negatives include no anxiety, blurred vision, chest pain, headaches, malaise/fatigue, neck pain, orthopnea, palpitations, peripheral edema, PND, shortness of breath or sweats. Identifiable causes of hypertension include chronic renal disease.   Hyperlipidemia  This is a chronic problem. The current episode started more than 1 year ago. The problem is controlled. Recent lipid tests were reviewed and are normal. Exacerbating diseases include chronic renal disease. He has no history of diabetes, hypothyroidism, liver disease, obesity or nephrotic syndrome. Pertinent negatives include no chest pain, focal sensory loss, focal weakness, leg pain, myalgias or shortness of breath.       Review of Systems   Constitutional: Negative for chills, fever and malaise/fatigue.   HENT: Negative for nosebleeds.    Eyes: Negative for blurred vision, double vision, vision loss in left eye and vision loss in right eye.   Cardiovascular: Negative for chest pain, claudication, dyspnea on exertion, irregular heartbeat, leg swelling, near-syncope, orthopnea, palpitations, paroxysmal nocturnal dyspnea and syncope.   Respiratory: Negative for cough,  hemoptysis, shortness of breath and wheezing.    Endocrine: Negative for cold intolerance and heat intolerance.   Hematologic/Lymphatic: Negative for bleeding problem. Does not bruise/bleed easily.   Skin: Negative for color change and rash.   Musculoskeletal: Negative for back pain, falls, muscle weakness, myalgias and neck pain.   Gastrointestinal: Negative for heartburn, hematemesis, hematochezia, hemorrhoids, jaundice, melena, nausea and vomiting.   Neurological: Negative for dizziness, focal weakness, headaches, light-headedness, loss of balance, numbness, tremors, vertigo and weakness.   Psychiatric/Behavioral: Negative for altered mental status, depression and memory loss. The patient is not nervous/anxious.    Allergic/Immunologic: Negative for hives and persistent infections.       Current Outpatient Medications on File Prior to Visit   Medication Sig Dispense Refill    amlodipine (NORVASC) 10 MG tablet Take 10 mg by mouth once daily.      aspirin 81 MG Chew Take 81 mg by mouth once daily.      atorvastatin (LIPITOR) 20 MG tablet Take 40 mg by mouth once daily.       B COMPLEX & C NO.20/FOLIC ACID (NEPHROCAPS ORAL) Take 1 capsule by mouth once daily.      cinacalcet (SENSIPAR) 90 MG Tab Take 60 mg by mouth 2 (two) times daily with meals.       clopidogreL (PLAVIX) 75 mg tablet Take 75 mg by mouth once daily.      ERGOCALCIFEROL, VITAMIN D2, (VITAMIN D ORAL) Take 2,000 mg by mouth once daily.      latanoprost 0.005 % ophthalmic solution 1 drop every evening.      sevelamer carbonate (RENVELA) 800 mg Tab Take 800 mg by mouth 3 (three) times daily with meals.      tadalafil (CIALIS) 5 MG tablet Take 5 mg by mouth daily as needed for Erectile Dysfunction.      doxycycline (VIBRAMYCIN) 100 MG Cap Take 1 capsule (100 mg total) by mouth 2 (two) times daily. (Patient not taking: Reported on 10/19/2021) 20 capsule 0    doxycycline (VIBRAMYCIN) 100 MG Cap Take 1 capsule (100 mg total) by mouth 2 (two)  "times daily. (Patient not taking: Reported on 10/19/2021) 20 capsule 0    losartan (COZAAR) 100 MG tablet Take 100 mg by mouth once daily.       No current facility-administered medications on file prior to visit.       /68 (BP Location: Left arm, Patient Position: Sitting, BP Method: Medium (Manual))   Pulse 60   Ht 5' 11" (1.803 m)   Wt 92.7 kg (204 lb 5.9 oz)   SpO2 96%   BMI 28.50 kg/m²        Objective:     Physical Exam  Constitutional:       General: He is not in acute distress.     Appearance: Normal appearance. He is well-developed. He is not ill-appearing, toxic-appearing or diaphoretic.   HENT:      Head: Normocephalic and atraumatic.      Nose: Nose normal.   Eyes:      General:         Right eye: No discharge.         Left eye: No discharge.      Conjunctiva/sclera:      Right eye: Right conjunctiva is not injected.      Left eye: Left conjunctiva is not injected.      Pupils: Pupils are equal.      Right eye: Pupil is round.      Left eye: Pupil is round.   Neck:      Thyroid: No thyromegaly.      Vascular: No carotid bruit or JVD.   Cardiovascular:      Rate and Rhythm: Normal rate and regular rhythm.  No extrasystoles are present.     Chest Wall: PMI is not displaced.      Pulses:           Radial pulses are 2+ on the right side and 2+ on the left side.        Femoral pulses are 2+ on the right side and 2+ on the left side.       Dorsalis pedis pulses are 1+ on the right side and 1+ on the left side.        Posterior tibial pulses are 1+ on the right side and 1+ on the left side.      Heart sounds: S1 normal and S2 normal. Murmur heard.    Harsh midsystolic murmur is present at the upper right sternal border. Graft right upper thigh.  Multiple fistulas and scars right arm.  No edema of arms.     Gallop present. S4 sounds present.   Pulmonary:      Effort: Pulmonary effort is normal.      Breath sounds: Normal breath sounds.   Abdominal:      Palpations: Abdomen is soft.      Tenderness: " There is no abdominal tenderness.   Musculoskeletal:      Cervical back: Neck supple.      Right ankle: No swelling, deformity or ecchymosis.      Left ankle: No swelling, deformity or ecchymosis.   Lymphadenopathy:      Head:      Right side of head: No submandibular adenopathy.      Left side of head: No submandibular adenopathy.      Cervical: No cervical adenopathy.   Skin:     General: Skin is warm and dry.      Findings: No rash.      Nails: There is no clubbing.   Neurological:      General: No focal deficit present.      Mental Status: He is alert and oriented to person, place, and time. He is not disoriented.      Cranial Nerves: No cranial nerve deficit.      Sensory: No sensory deficit.   Psychiatric:         Attention and Perception: Attention and perception normal.         Mood and Affect: Mood and affect normal.         Speech: Speech normal.         Behavior: Behavior normal.         Thought Content: Thought content normal.         Cognition and Memory: Cognition and memory normal.         Judgment: Judgment normal.         Assessment:     1. Renovascular hypertension    2. Hypercholesterolemia    3. Overweight    4. End stage kidney disease        Plan:     1. Hypertension   1997: Diagnosed with severe hypertension.   On amlodipine 10 mg Q24 and losartan 100 mg Q24.   Keeping log at home.   Appears well controlled.    2. Hypercholesterolemia   1985: Began statin.   On atorvastatin 20 mg Q24.   6/17/2016: Chol 109. HDL 31. LDL 43. .   On atorvastatin 40 mg Q24.   Favorable lipid panel.    3. Overweight   4/19/2021: Weight 93 kg. BMI 29.   Encouraged to lose weight.    4. End Stage Kidney Disease   9/18/1997: Began dialysis. [facility]   Dr. Tammi Agarwal.    5. Anemia   Due to end stage renal disease and nephrectomies.   Receiving erythropoietin injections about every two weeks.    6. Primary Care   Dr. Tammi Agarwal.    F/u 6 months.    Manuel Vides M.D.

## 2022-04-22 ENCOUNTER — HOSPITAL ENCOUNTER (EMERGENCY)
Facility: OTHER | Age: 62
Discharge: HOME | End: 2022-04-22
Attending: EMERGENCY MEDICINE
Payer: MEDICARE

## 2022-04-22 VITALS
WEIGHT: 200 LBS | HEIGHT: 71 IN | BODY MASS INDEX: 28 KG/M2 | TEMPERATURE: 98 F | HEART RATE: 81 BPM | RESPIRATION RATE: 18 BRPM | DIASTOLIC BLOOD PRESSURE: 83 MMHG | SYSTOLIC BLOOD PRESSURE: 136 MMHG | OXYGEN SATURATION: 99 %

## 2022-04-22 DIAGNOSIS — R07.9 CHEST PAIN: ICD-10-CM

## 2022-04-22 DIAGNOSIS — R27.0 ATAXIA: Primary | ICD-10-CM

## 2022-04-22 LAB
ALBUMIN SERPL BCP-MCNC: 4.9 G/DL (ref 3.5–5.2)
ALP SERPL-CCNC: 205 U/L (ref 55–135)
ALT SERPL W/O P-5'-P-CCNC: 36 U/L (ref 10–44)
ANION GAP SERPL CALC-SCNC: 17 MMOL/L (ref 8–16)
AST SERPL-CCNC: 24 U/L (ref 10–40)
BASOPHILS # BLD AUTO: 0.02 K/UL (ref 0–0.2)
BASOPHILS NFR BLD: 0.4 % (ref 0–1.9)
BILIRUB SERPL-MCNC: 0.7 MG/DL (ref 0.1–1)
BUN SERPL-MCNC: 18 MG/DL (ref 6–20)
CALCIUM SERPL-MCNC: 9.4 MG/DL (ref 8.7–10.5)
CHLORIDE SERPL-SCNC: 97 MMOL/L (ref 95–110)
CO2 SERPL-SCNC: 25 MMOL/L (ref 23–29)
CREAT SERPL-MCNC: 7.1 MG/DL (ref 0.5–1.4)
CTP QC/QA: YES
DIFFERENTIAL METHOD: ABNORMAL
EOSINOPHIL # BLD AUTO: 0.2 K/UL (ref 0–0.5)
EOSINOPHIL NFR BLD: 3.2 % (ref 0–8)
ERYTHROCYTE [DISTWIDTH] IN BLOOD BY AUTOMATED COUNT: 13.9 % (ref 11.5–14.5)
EST. GFR  (AFRICAN AMERICAN): 9 ML/MIN/1.73 M^2
EST. GFR  (NON AFRICAN AMERICAN): 8 ML/MIN/1.73 M^2
GLUCOSE SERPL-MCNC: 94 MG/DL (ref 70–110)
HCT VFR BLD AUTO: 37.4 % (ref 40–54)
HGB BLD-MCNC: 12.9 G/DL (ref 14–18)
IMM GRANULOCYTES # BLD AUTO: 0.01 K/UL (ref 0–0.04)
IMM GRANULOCYTES NFR BLD AUTO: 0.2 % (ref 0–0.5)
LYMPHOCYTES # BLD AUTO: 1.4 K/UL (ref 1–4.8)
LYMPHOCYTES NFR BLD: 25.7 % (ref 18–48)
MCH RBC QN AUTO: 29.5 PG (ref 27–31)
MCHC RBC AUTO-ENTMCNC: 34.5 G/DL (ref 32–36)
MCV RBC AUTO: 86 FL (ref 82–98)
MONOCYTES # BLD AUTO: 0.7 K/UL (ref 0.3–1)
MONOCYTES NFR BLD: 12.7 % (ref 4–15)
NEUTROPHILS # BLD AUTO: 3.3 K/UL (ref 1.8–7.7)
NEUTROPHILS NFR BLD: 57.8 % (ref 38–73)
NRBC BLD-RTO: 0 /100 WBC
PLATELET # BLD AUTO: 177 K/UL (ref 150–450)
PMV BLD AUTO: 10.4 FL (ref 9.2–12.9)
POTASSIUM SERPL-SCNC: 4.8 MMOL/L (ref 3.5–5.1)
PROT SERPL-MCNC: 9.1 G/DL (ref 6–8.4)
RBC # BLD AUTO: 4.37 M/UL (ref 4.6–6.2)
SARS-COV-2 RDRP RESP QL NAA+PROBE: NEGATIVE
SODIUM SERPL-SCNC: 139 MMOL/L (ref 136–145)
TROPONIN I SERPL DL<=0.01 NG/ML-MCNC: 0.11 NG/ML (ref 0–0.03)
TROPONIN I SERPL DL<=0.01 NG/ML-MCNC: 0.11 NG/ML (ref 0–0.03)
WBC # BLD AUTO: 5.61 K/UL (ref 3.9–12.7)

## 2022-04-22 PROCEDURE — 93010 ELECTROCARDIOGRAM REPORT: CPT | Mod: ,,, | Performed by: INTERNAL MEDICINE

## 2022-04-22 PROCEDURE — 85025 COMPLETE CBC W/AUTO DIFF WBC: CPT | Performed by: EMERGENCY MEDICINE

## 2022-04-22 PROCEDURE — 93010 EKG 12-LEAD: ICD-10-PCS | Mod: ,,, | Performed by: INTERNAL MEDICINE

## 2022-04-22 PROCEDURE — U0002 COVID-19 LAB TEST NON-CDC: HCPCS | Performed by: EMERGENCY MEDICINE

## 2022-04-22 PROCEDURE — 84484 ASSAY OF TROPONIN QUANT: CPT | Mod: 91 | Performed by: EMERGENCY MEDICINE

## 2022-04-22 PROCEDURE — 80053 COMPREHEN METABOLIC PANEL: CPT | Performed by: EMERGENCY MEDICINE

## 2022-04-22 PROCEDURE — 25000003 PHARM REV CODE 250: Performed by: EMERGENCY MEDICINE

## 2022-04-22 PROCEDURE — 99285 EMERGENCY DEPT VISIT HI MDM: CPT | Mod: 25

## 2022-04-22 PROCEDURE — 93005 ELECTROCARDIOGRAM TRACING: CPT

## 2022-04-22 RX ORDER — MECLIZINE HYDROCHLORIDE 25 MG/1
25 TABLET ORAL
Status: COMPLETED | OUTPATIENT
Start: 2022-04-22 | End: 2022-04-22

## 2022-04-22 RX ORDER — MECLIZINE HYDROCHLORIDE 25 MG/1
25 TABLET ORAL 3 TIMES DAILY PRN
Qty: 20 TABLET | Refills: 0 | Status: SHIPPED | OUTPATIENT
Start: 2022-04-22 | End: 2022-10-18

## 2022-04-22 RX ADMIN — MECLIZINE HYDROCHLORIDE 25 MG: 25 TABLET ORAL at 01:04

## 2022-04-22 NOTE — ED NOTES
Ambulated patient in hallway and slight off balance notice, Dr. Fitzgerald made aware, no new orders given.

## 2022-04-22 NOTE — ED TRIAGE NOTES
Patient presents to ED with c/o balance problems since having dialysis this morning. He reports feeling like he is going to fall while walking. Denies dizzines, unilateral weakness, nausea, visual problems or a room spinning sensation. He does report having a tinnitus x 1 week with sinus pressure and congestion.

## 2022-04-22 NOTE — ED PROVIDER NOTES
"Encounter Date: 4/22/2022    SCRIBE #1 NOTE: I, Fernanda Hedrick, am scribing for, and in the presence of, Jesse Gamez MD.       History     Chief Complaint   Patient presents with    Loss of Balance     Pt presents to the ER with complaints of feeling off balance since finishing dialysis this morning. Pt also complains of a tightness in both sides chest, both shoulders, and neck that started around the same time. Pt also reporting a sore "bump" to the top of the head; has had bump for "a while"; was recently on antibiotics.       Time seen by provider: 12:15 PM    This is a 61 y.o. male with a PMHx of stroke, ESRD, dialysis, and HTN who presents with a chief complaint of loss of balance that began this morning. The patient reports that he has noticed ringing in his ears and head for the past week. He states that he woke up this morning feeling off balance. He explains that he was able to walk to dialysis and felt he was veering to the right. Following his normal dialysis, he symptoms persisted. He adds that this is similar to what he experienced with his stroke in 2017 He denies any feelings of the room spinning, nausea, extremity numbness or weakness.     His second compliant is that he is expericing body aches and chest tightness that began today. He notes that it does not worsen with movement. He also notes that he had a previous episode of this a few days ago that resolved without intervention until this morning. He denies any N/V, sore throat, cough or SOB. He notes that he also has a bump on his head that has been treated with antibiotics. This is the extent of the patient's complaints at this time.    The history is provided by the patient.     Review of patient's allergies indicates:   Allergen Reactions    Minoxidil Itching, Swelling and Other (See Comments)     " drops blood pressure."      Past Medical History:   Diagnosis Date    Cancer     RCC s/p bilateral nephrectomy    Cardiomegaly     " Dialysis patient     Encounter for blood transfusion     Glaucoma     H/O colonoscopy     Hyperlipemia     Hypertension     Renal disorder      Past Surgical History:   Procedure Laterality Date    DIALYSIS FISTULA CREATION      in arm and groin on R side     KIDNEY SURGERY      KIDNEY TRANSPLANT  kidney cancer    NEPHRECTOMY Bilateral Right 2009;  Left 2010    RCC both sides     Family History   Problem Relation Age of Onset    Heart disease Mother     Heart disease Father     Diabetes Father     Hypertension Father      Social History     Tobacco Use    Smoking status: Never Smoker    Smokeless tobacco: Never Used   Substance Use Topics    Alcohol use: No    Drug use: No     Review of Systems   Constitutional: Negative for fever.   HENT: Positive for tinnitus. Negative for sore throat.    Respiratory: Positive for chest tightness. Negative for shortness of breath.    Cardiovascular: Negative for chest pain.   Gastrointestinal: Negative for nausea.   Genitourinary: Negative for dysuria.   Musculoskeletal: Positive for myalgias. Negative for back pain.   Skin: Negative for rash.   Neurological: Negative for headaches.   Hematological: Does not bruise/bleed easily.       Physical Exam     Initial Vitals [04/22/22 1125]   BP Pulse Resp Temp SpO2   125/81 80 18 97.9 °F (36.6 °C) 98 %      MAP       --         Physical Exam    Nursing note and vitals reviewed.  Constitutional: He appears well-developed and well-nourished. He is not diaphoretic. No distress.   HENT:   Head: Normocephalic and atraumatic.   Small posterior scalp cyst with no erythema or drainage.   Eyes: EOM are normal.   Neck: Neck supple.   Normal range of motion.  Cardiovascular: Normal rate, regular rhythm and normal heart sounds. Exam reveals no gallop and no friction rub.    No murmur heard.  Pulmonary/Chest: Breath sounds normal. No respiratory distress. He has no wheezes. He has no rhonchi. He has no rales.   No chest wall  tenderness.   Musculoskeletal:         General: No edema. Normal range of motion.      Cervical back: Normal range of motion and neck supple.      Comments: Right upper thigh av graft positive thrill.     Neurological: He is alert and oriented to person, place, and time.   Finger to nose and heel to shin intact bilaterally. Mild ataxia when ambulating without assistance.    Skin: Skin is warm and dry.         ED Course   Procedures  Labs Reviewed   CBC W/ AUTO DIFFERENTIAL - Abnormal; Notable for the following components:       Result Value    RBC 4.37 (*)     Hemoglobin 12.9 (*)     Hematocrit 37.4 (*)     All other components within normal limits   COMPREHENSIVE METABOLIC PANEL - Abnormal; Notable for the following components:    Creatinine 7.1 (*)     Total Protein 9.1 (*)     Alkaline Phosphatase 205 (*)     Anion Gap 17 (*)     eGFR if  9 (*)     eGFR if non  8 (*)     All other components within normal limits   TROPONIN I - Abnormal; Notable for the following components:    Troponin I 0.109 (*)     All other components within normal limits   TROPONIN I - Abnormal; Notable for the following components:    Troponin I 0.113 (*)     All other components within normal limits   SARS-COV-2 RDRP GENE     EKG Readings: (Independently Interpreted)   Rhythm: Normal Sinus Rhythm. Heart Rate: 75. Clinical Impression: Left Ventricular Hypertrophy (LDH)   No acute ischemia. No peaked T waves.     ECG Results          EKG 12-lead (Final result)  Result time 04/22/22 14:49:54    Final result by Interface, Lab In [facility] (04/22/22 14:49:54)                 Narrative:    Test Reason : R07.9,    Vent. Rate : 075 BPM     Atrial Rate : 075 BPM     P-R Int : 190 ms          QRS Dur : 116 ms      QT Int : 386 ms       P-R-T Axes : 068 007 092 degrees     QTc Int : 431 ms    Normal sinus rhythm  LVH with QRS widening and repolarization abnormality  Abnormal ECG    Confirmed by Manuel Vides MD ([id number])  on 4/22/2022 2:49:41 PM    Referred By: PHIL   SELF           Confirmed By:Manuel Vides MD                            Imaging Results          MRI Brain Without Contrast (Final result)  Result time 04/22/22 14:59:05    Final result by Nestor Gomez MD (04/22/22 14:59:05)                 Impression:      1. No definite acute intracranial findings.  No evidence of acute ischemia or recent infarction, as questioned.  2. Allowing for differences in technique, likely no substantial interval change relative to remote MRI of the brain performed 06/16/2016.  Additional details, as provided in the body of report.      Electronically signed by: Nestor Gomez  Date:    04/22/2022  Time:    14:59             Narrative:    EXAMINATION:  MRI BRAIN WITHOUT CONTRAST    CLINICAL HISTORY:  Neuro deficit, acute, stroke suspected;    TECHNIQUE:  Multiplanar multisequence MR imaging of the brain was performed without contrast.    COMPARISON:  CT head, 04/22/2022, 12:58 hours    FINDINGS:  Parenchyma: There is no restricted diffusion to suggest acute or subacute ischemic infarct.Redemonstrated right paramedian parietal lobe encephalomalacia gliosis additional volume loss of the adjoining posterior aspect of the corpus callosum.  Few small foci of susceptibility signal loss marginating the regions of encephalomalacia could relate to prior hemorrhage potentially related to prior ischemia, though are nonspecific.    Elsewhere, there appears to be a generalized pattern age-related parenchymal volume loss.  Nonspecific areas of white matter T2/FLAIR hyperintense signal may relate to sequela of chronic small vessel ischemic disease.    Additional comments: There is no midline shift, abnormal extra-axial fluid collection, or acute intracranial hemorrhage. The basal cisterns are patent.    Ventricles: Normal.    Flow voids: The normal major intracranial arterial flow voids are visualized.  Redemonstrated small caliber flow void in  the basilar artery, which may be developmental given apparent prominence of the posterior communicating arteries.    Sinuses and mastoid air cells: Scattered relatively minor paranasal sinus mucosal thickening with a few retention cysts suggested.    Orbits: Normal    Midline structures: Partially empty sella.  The craniocervical junction are normal.  Suggested Tornwaldt cyst versus mucous retention cyst in the posterior midline nasopharynx.    Marrow: Normal    Possible sebaceous cyst occipital scalp soft tissues.                                 CT Head Without Contrast (Final result)  Result time 04/22/22 13:13:07    Final result by Colt Saini MD (04/22/22 13:13:07)                 Impression:      1. No acute large vascular territory infarct or intracranial hemorrhage identified.  If persistent neurologic deficit, MRI brain with diffusion-weighted sequences can be obtained.  2. Right parietal lobe encephalomalacia likely sequela of remote infarct, unchanged.  3. Stable chronic nonspecific partial empty sella configuration.      Electronically signed by: Colt Saini MD  Date:    04/22/2022  Time:    13:13             Narrative:    EXAMINATION:  CT HEAD WITHOUT CONTRAST    CLINICAL HISTORY:  Neuro deficit, acute, stroke suspected;    TECHNIQUE:  Low dose axial CT images obtained throughout the head without intravenous contrast. Sagittal and coronal reconstructions were performed.    COMPARISON:  MRI brain and head CT 06/16/2016    FINDINGS:  Intracranial compartment:    Parasagittal right parietal lobe encephalomalacia likely sequela of remote infarct and associated ex vacuo dilatation of posterior horn of the right lateral ventricle, similar to prior.  Ventricles are otherwise midline without distortion by mass effect or acute hydrocephalus.  No extra-axial blood or fluid collections.  Unchanged nonspecific partial empty sella configuration.    No definite new focal areas of abnormal parenchymal attenuation.   No parenchymal mass, hemorrhage, edema or acute major vascular distribution infarct.  Calcific atherosclerosis of the bilateral cavernous ICAs.    Skull/extracranial contents (limited evaluation): No fracture. Mastoid air cells and paranasal sinuses are essentially clear.                                 Medications   meclizine tablet 25 mg (25 mg Oral Given 4/22/22 1901)     Medical Decision Making:   History:   Old Medical Records: I decided to obtain old medical records.  Initial Assessment:       61-year-old male with history of HTN, ESRD on HD presents for evaluation of off balance sensation that started when he woke up this morning.  He states he is still able to walk without assistance but feels that he is leaning to his right.  He has had a ear and head ringing sensation for the past week, but denies any room spinning sensation or nausea.  Patient also noted diffuse body aches this morning, and still has a chest tightness, not worse with deep breaths or any specific movement.  He had a similar episode 2 days ago affecting his chest and left shoulder that resolved without intervention, no known injury or activity to cause this.  He denies any associated shortness of breath or cough/wheezing.  He had full dialysis session before coming here.    Given risk factors and ataxia, concern for posterior CVA, though could be from peripheral vertigo given associated tinnitus, though no URI symptoms or nausea typical of this.  Will get CT head and consider MRI if negative.  Unclear etiology of body aches and chest tightness, no wheezing or abnormal lung sounds, no reproducible chest wall tenderness or pleuritic component, no SOB to suggest PE.  Unlikely to be electrolyte abnormality since he just had dialysis.  Given risk factors will do ACS workup.  Patient also mentions a cyst on his scalp that is still present and somewhat painful, not worse than usual.  Per chart review he has previously been diagnosed with a  sebaceous cyst and advised surgical excision, advised to follow-up with surgery for this.    Initial labs with normal electrolytes but elevated troponin to 0.109, similar to previous levels and 2017.  EKG with no sign of ischemia.  Initial CT head unremarkable except for previous CT.  MRI brain then done with no evidence for acute ischemia.  On reassessment patient with resolved chest tightness, and 2nd troponin stable, low suspicion for ACS.  He states his ataxia has improved after meclizine given, was able to ambulate in the ED without assistance.  However since his symptoms have not fully resolved, I offered admission overnight with plan for Neurology consult in the morning, and will continue meclizine treatment.  He prefers a trial of outpatient management with meclizine, and understands return to the ED for any persistent difficulty ambulating or other new concerning symptoms.  Although I would prefer patient to be admitted, discharged reasonable given reassuring MRI, though posterior CVAs can be missed on MRI especially without contrast.  Wife at bedside also understands strict return precautions for and need for close follow-up to ensure improvement with meclizine.        Differential Diagnosis:      Independently Interpreted Test(s):   I have ordered and independently interpreted EKG Reading(s) - see prior notes  Clinical Tests:   Lab Tests: Ordered and Reviewed  Radiological Study: Ordered and Reviewed  Medical Tests: Ordered and Reviewed          Scribe Attestation:   Scribe #1: I performed the above scribed service and the documentation accurately describes the services I performed. I attest to the accuracy of the note.               I, Dr. Jesse Gamez, personally performed the services described in this documentation. All medical record entries made by the scribe were at my direction and in my presence.  I have reviewed the chart and agree that the record reflects my personal performance and is  accurate and complete. Jesse Gamez MD.       Clinical Impression:   Final diagnoses:  [R07.9] Chest pain  [R27.0] Ataxia (Primary)          ED Disposition Condition    Discharge Stable        ED Prescriptions     Medication Sig Dispense Start Date End Date Auth. Provider    meclizine (ANTIVERT) 25 mg tablet Take 1 tablet (25 mg total) by mouth 3 (three) times daily as needed for Dizziness. 20 tablet 4/22/2022  Jesse Gamez MD        Follow-up Information     Follow up With Specialties Details Why Contact Info    [facility] - Emergency Dept Emergency Medicine Go to  If symptoms worsen [address] [facility] [address]  [phone]           Jesse Gamez MD  04/22/22 6363

## 2022-08-03 ENCOUNTER — HOSPITAL ENCOUNTER (EMERGENCY)
Facility: OTHER | Age: 62
Discharge: HOME | End: 2022-08-03
Attending: EMERGENCY MEDICINE
Payer: MEDICARE

## 2022-08-03 VITALS
TEMPERATURE: 98 F | OXYGEN SATURATION: 100 % | HEIGHT: 71 IN | BODY MASS INDEX: 28 KG/M2 | WEIGHT: 200 LBS | SYSTOLIC BLOOD PRESSURE: 126 MMHG | HEART RATE: 66 BPM | DIASTOLIC BLOOD PRESSURE: 71 MMHG | RESPIRATION RATE: 18 BRPM

## 2022-08-03 DIAGNOSIS — S90.121A CONTUSION OF LESSER TOE OF RIGHT FOOT WITHOUT DAMAGE TO NAIL, INITIAL ENCOUNTER: Primary | ICD-10-CM

## 2022-08-03 DIAGNOSIS — T14.8XXA BLOOD BLISTER: ICD-10-CM

## 2022-08-03 PROCEDURE — 99283 EMERGENCY DEPT VISIT LOW MDM: CPT | Mod: 25

## 2022-08-03 PROCEDURE — 25000003 PHARM REV CODE 250: Performed by: PHYSICIAN ASSISTANT

## 2022-08-03 RX ORDER — ACETAMINOPHEN 500 MG
1000 TABLET ORAL
Status: COMPLETED | OUTPATIENT
Start: 2022-08-03 | End: 2022-08-03

## 2022-08-03 RX ADMIN — ACETAMINOPHEN 1000 MG: 500 TABLET, FILM COATED ORAL at 12:08

## 2022-08-03 NOTE — ED PROVIDER NOTES
"Source of History:  Patient     Chief complaint:  Toe Injury (Pt injured right pinky toe x2 days ago on a desk. Pt c/o pain, bruising and swollen.)      HPI:  Glenn Collier is a 61 y.o. male presenting to the emergency department with c/o 5th digit pain x 2 days.  States stubbed his toe twice in last day.  Pain has improved but his dialysis nurse wanted him to get an xray.  No pain with ambulation     This is the extent to the patients complaints today here in the emergency department.    PMH:  As per HPI and below:  Past Medical History:   Diagnosis Date    Cancer     RCC s/p bilateral nephrectomy    Cardiomegaly     Dialysis patient     Encounter for blood transfusion     Glaucoma     H/O colonoscopy     Hyperlipemia     Hypertension     Renal disorder      Past Surgical History:   Procedure Laterality Date    DIALYSIS FISTULA CREATION      in arm and groin on R side     KIDNEY SURGERY      KIDNEY TRANSPLANT  kidney cancer    NEPHRECTOMY Bilateral Right 2009;  Left 2010    RCC both sides       Social History     Tobacco Use    Smoking status: Never Smoker    Smokeless tobacco: Never Used   Substance Use Topics    Alcohol use: No    Drug use: No     Review of patient's allergies indicates:   Allergen Reactions    Minoxidil Itching, Swelling and Other (See Comments)     " drops blood pressure."        ROS: As per HPI and below:  General: No fever.  No chills.  Eyes: No visual changes.   ENT: No sore throat. No ear pain.  Urinary: No abnormal urination.  MSK: toe swelling  Integument: No rashes or lesions.      Physical Exam:    /71 (BP Location: Left arm, Patient Position: Sitting)   Pulse 66   Temp 98.1 °F (36.7 °C) (Oral)   Resp 18   Ht 5' 11" (1.803 m)   Wt 90.7 kg (200 lb)   SpO2 100%   BMI 27.89 kg/m²   Vitals:    08/03/22 1117 08/03/22 1320   BP: 129/69 126/71   Pulse: 65 66   Resp: 18 18   Temp: 98.3 °F (36.8 °C) 98.1 °F (36.7 °C)   TempSrc: Oral Oral   SpO2: 100% 100%   Weight: " "90.7 kg (200 lb)    Height: 5' 11" (1.803 m)        Nursing note and vital signs reviewed.  Appearance: No acute distress.  Eyes: No conjunctival injection.  Extraocular muscles are intact.  ENT: Normal phonation.  Musculoskeletal: right 5th digit, nontender.  No deformity.  There is a blood blister to distal aspect.   Skin: No rashes seen.  Good turgor.  No abrasions.  No ecchymoses.  Mental Status:  Alert and oriented x 3.  Appropriate, conversant.    Labs Reviewed - No data to display    X-Ray Toe 2 or More Views Right   Final Result            Initial Impression/ Differential Dx:  Differential Diagnosis includes, but is not limited to:  Metatarsal fracture, sprain/strain, phalanx fracture, phalanx dislocation, gout          MDM:    61 y.o. male with toe swelling x 2 days after stubbing his toe.  Xray reveals no fracture.  He is ambulatory without pain.  There is a blood blister noted to distal aspect.  No evidence of infection.  Xray negative for fracture.   Xray states small FB, on exam there is no puncture wound, pain or erythema to the area suggested.           Diagnostic Impression:    1. Contusion of lesser toe of right foot without damage to nail, initial encounter    2. Blood blister         ED Disposition Condition    Discharge Stable          ED Prescriptions     None        Follow-up Information     Follow up With Specialties Details Why Contact Info    Tammi Agarwal MD Nephrology Schedule an appointment as soon as possible for a visit in 3 days  [address] [address]  [phone]      [facility] - Emergency Dept Emergency Medicine Go to  If symptoms worsen [address] [facility] [id number] [phone]          ED Prescriptions     None           Cayla Marti, TORRIE  08/03/22 1437    "

## 2022-08-03 NOTE — FIRST PROVIDER EVALUATION
" Emergency Department TeleTriage Encounter Note      CHIEF COMPLAINT    Chief Complaint   Patient presents with    Toe Injury     Pt injured right pinky toe x2 days ago on a desk. Pt c/o pain, bruising and swollen.       VITAL SIGNS   Initial Vitals [08/03/22 1117]   BP Pulse Resp Temp SpO2   129/69 65 18 98.3 °F (36.8 °C) 100 %      MAP       --            ALLERGIES    Review of patient's allergies indicates:   Allergen Reactions    Minoxidil Itching, Swelling and Other (See Comments)     " drops blood pressure."        PROVIDER TRIAGE NOTE  This is a teletriage evaluation of a 61 y.o. male presenting to the ED with c/o right 5th toe pain after hitting it twice. No meds at home.     PE: cannot see toe. Non-toxic/well-appearing. No respiratory distress, speaks in full sentences without issue. No active emesis nor cough. Normal eye contact and mentation.     Plan: xr, meds. Further/augmented workup at discretion of examining provider.     All ED beds are full at present; patient notified of this status.  Patient seen and medically screened by IRENA via teletriage. Orders initiated at triage to expedite care.  Patient is stable and will be placed in an ED bed when available.  Care will be transferred to an alternate provider when patient has been placed in an Exam Room further exam, additional orders, and disposition.         ORDERS  Labs Reviewed - No data to display    ED Orders (720h ago, onward)    Start Ordered     Status Ordering Provider    08/03/22 1141 08/03/22 1140  X-Ray Toe 2 or More Views Right  1 time imaging         Ordered CONSTANCE ESCALANTE            Virtual Visit Note: The provider triage portion of this emergency department evaluation and documentation was performed via [employer], a HIPAA-compliant telemedicine application, in concert with a tele-presenter in the room. A face to face patient evaluation with one of my colleagues will occur once the patient is placed in an emergency department " room.      DISCLAIMER: This note was prepared with [employer] voice recognition transcription software. Garbled syntax, mangled pronouns, and other bizarre constructions may be attributed to that software system.

## 2022-10-18 ENCOUNTER — OFFICE VISIT (OUTPATIENT)
Dept: CARDIOLOGY | Facility: CLINIC | Age: 62
End: 2022-10-18
Attending: INTERNAL MEDICINE
Payer: MEDICARE

## 2022-10-18 VITALS
HEIGHT: 71 IN | BODY MASS INDEX: 29.24 KG/M2 | DIASTOLIC BLOOD PRESSURE: 84 MMHG | HEART RATE: 67 BPM | WEIGHT: 208.88 LBS | SYSTOLIC BLOOD PRESSURE: 140 MMHG | OXYGEN SATURATION: 99 %

## 2022-10-18 DIAGNOSIS — I15.0 RENOVASCULAR HYPERTENSION: ICD-10-CM

## 2022-10-18 DIAGNOSIS — E78.00 HYPERCHOLESTEROLEMIA: ICD-10-CM

## 2022-10-18 DIAGNOSIS — N18.6 END STAGE KIDNEY DISEASE: ICD-10-CM

## 2022-10-18 DIAGNOSIS — E66.3 OVERWEIGHT: ICD-10-CM

## 2022-10-18 PROCEDURE — 99213 PR OFFICE/OUTPT VISIT, EST, LEVL III, 20-29 MIN: ICD-10-PCS | Mod: S$PBB,,, | Performed by: INTERNAL MEDICINE

## 2022-10-18 PROCEDURE — 99213 OFFICE O/P EST LOW 20 MIN: CPT | Mod: S$PBB,,, | Performed by: INTERNAL MEDICINE

## 2022-10-18 PROCEDURE — 99999 PR PBB SHADOW E&M-EST. PATIENT-LVL III: CPT | Mod: PBBFAC,,, | Performed by: INTERNAL MEDICINE

## 2022-10-18 PROCEDURE — 99213 OFFICE O/P EST LOW 20 MIN: CPT | Mod: PBBFAC | Performed by: INTERNAL MEDICINE

## 2022-10-18 PROCEDURE — 99999 PR PBB SHADOW E&M-EST. PATIENT-LVL III: ICD-10-PCS | Mod: PBBFAC,,, | Performed by: INTERNAL MEDICINE

## 2022-10-18 NOTE — PROGRESS NOTES
Subjective:     Glenn Collier is a 61 y.o. male with hypertension and hypercholesterolemia. He is overweight. He developed end stage renal disease due to hypertension in 1997 and then began hemodialysis. Access is a graft in the right thigh. He has undergone bilateral nephrectomies. He had a large pericardial effusion in 2006 that resolved with intensified dialysis. He receives erythropoietin shots about every two weeks. There has been no issues with his access in the right calf. He denies any exertional chest pain or exertional dyspnea. He denies any claudication. No palpitations or weak spells. No bleeding. No issues with any of his prescribed medications. Feeling well overall.      Hypertension  This is a chronic problem. The current episode started more than 1 year ago. The problem is unchanged. The problem is controlled (usually 120-130/60-80 mmHg at home). Pertinent negatives include no anxiety, blurred vision, chest pain, headaches, malaise/fatigue, neck pain, orthopnea, palpitations, peripheral edema, PND, shortness of breath or sweats. Identifiable causes of hypertension include chronic renal disease.   Hyperlipidemia  This is a chronic problem. The current episode started more than 1 year ago. The problem is controlled. Recent lipid tests were reviewed and are normal. Exacerbating diseases include chronic renal disease. He has no history of diabetes, hypothyroidism, liver disease, obesity or nephrotic syndrome. Pertinent negatives include no chest pain, focal sensory loss, focal weakness, leg pain, myalgias or shortness of breath.     Review of Systems   Constitutional: Negative for chills, fever and malaise/fatigue.   HENT:  Negative for nosebleeds.    Eyes:  Negative for blurred vision, double vision, vision loss in left eye and vision loss in right eye.   Cardiovascular:  Negative for chest pain, claudication, dyspnea on exertion, irregular heartbeat, leg swelling, near-syncope, orthopnea,  palpitations, paroxysmal nocturnal dyspnea and syncope.   Respiratory:  Negative for cough, hemoptysis, shortness of breath and wheezing.    Endocrine: Negative for cold intolerance and heat intolerance.   Hematologic/Lymphatic: Negative for bleeding problem. Does not bruise/bleed easily.   Skin:  Negative for color change and rash.   Musculoskeletal:  Negative for back pain, falls, muscle weakness, myalgias and neck pain.   Gastrointestinal:  Negative for heartburn, hematemesis, hematochezia, hemorrhoids, jaundice, melena, nausea and vomiting.   Neurological:  Negative for dizziness, focal weakness, headaches, light-headedness, loss of balance, numbness, tremors, vertigo and weakness.   Psychiatric/Behavioral:  Negative for altered mental status, depression and memory loss. The patient is not nervous/anxious.    Allergic/Immunologic: Negative for hives and persistent infections.       Current Outpatient Medications on File Prior to Visit   Medication Sig Dispense Refill    aspirin 81 MG Chew Take 81 mg by mouth once daily.      atorvastatin (LIPITOR) 20 MG tablet Take 40 mg by mouth once daily.       B COMPLEX & C NO.20/FOLIC ACID (NEPHROCAPS ORAL) Take 1 capsule by mouth once daily.      cinacalcet (SENSIPAR) 90 MG Tab Take 60 mg by mouth 2 (two) times daily with meals.       clopidogreL (PLAVIX) 75 mg tablet Take 75 mg by mouth once daily.      ERGOCALCIFEROL, VITAMIN D2, (VITAMIN D ORAL) Take 2,000 mg by mouth once daily.      latanoprost 0.005 % ophthalmic solution 1 drop every evening.      losartan (COZAAR) 100 MG tablet Take 100 mg by mouth once daily.      sevelamer carbonate (RENVELA) 800 mg Tab Take 800 mg by mouth 3 (three) times daily with meals.      amlodipine (NORVASC) 10 MG tablet Take 10 mg by mouth once daily.      doxycycline (VIBRAMYCIN) 100 MG Cap Take 1 capsule (100 mg total) by mouth 2 (two) times daily. (Patient not taking: Reported on 10/19/2021) 20 capsule 0    doxycycline (VIBRAMYCIN) 100  "MG Cap Take 1 capsule (100 mg total) by mouth 2 (two) times daily. (Patient not taking: Reported on 10/19/2021) 20 capsule 0    meclizine (ANTIVERT) 25 mg tablet Take 1 tablet (25 mg total) by mouth 3 (three) times daily as needed for Dizziness. 20 tablet 0    tadalafil (CIALIS) 5 MG tablet Take 5 mg by mouth daily as needed for Erectile Dysfunction.       No current facility-administered medications on file prior to visit.       BP (!) 140/84 (BP Location: Left arm, Patient Position: Sitting, BP Method: Medium (Manual))   Pulse 67   Ht 5' 11" (1.803 m)   Wt 94.7 kg (208 lb 14.2 oz)   SpO2 99%   BMI 29.13 kg/m²        Objective:     Physical Exam  Constitutional:       General: He is not in acute distress.     Appearance: Normal appearance. He is well-developed. He is not ill-appearing, toxic-appearing or diaphoretic.   HENT:      Head: Normocephalic and atraumatic.      Nose: Nose normal.   Eyes:      General:         Right eye: No discharge.         Left eye: No discharge.      Conjunctiva/sclera:      Right eye: Right conjunctiva is not injected.      Left eye: Left conjunctiva is not injected.      Pupils: Pupils are equal.      Right eye: Pupil is round.      Left eye: Pupil is round.   Neck:      Thyroid: No thyromegaly.      Vascular: No carotid bruit or JVD.   Cardiovascular:      Rate and Rhythm: Normal rate and regular rhythm. No extrasystoles are present.     Chest Wall: PMI is not displaced.      Pulses:           Radial pulses are 2+ on the right side and 2+ on the left side.        Femoral pulses are 2+ on the right side and 2+ on the left side.       Dorsalis pedis pulses are 1+ on the right side and 1+ on the left side.        Posterior tibial pulses are 1+ on the right side and 1+ on the left side.      Heart sounds: S1 normal and S2 normal. Murmur heard.   Harsh midsystolic murmur is present at the upper right sternal border. Graft right upper thigh.  Multiple fistulas and scars right arm.  No " edema of arms.     Gallop present. S4 sounds present.   Pulmonary:      Effort: Pulmonary effort is normal.      Breath sounds: Normal breath sounds.   Abdominal:      Palpations: Abdomen is soft.      Tenderness: There is no abdominal tenderness.   Musculoskeletal:      Cervical back: Neck supple.      Right ankle: No swelling, deformity or ecchymosis.      Left ankle: No swelling, deformity or ecchymosis.   Lymphadenopathy:      Head:      Right side of head: No submandibular adenopathy.      Left side of head: No submandibular adenopathy.      Cervical: No cervical adenopathy.   Skin:     General: Skin is warm and dry.      Findings: No rash.      Nails: There is no clubbing.   Neurological:      General: No focal deficit present.      Mental Status: He is alert and oriented to person, place, and time. He is not disoriented.      Cranial Nerves: No cranial nerve deficit.      Sensory: No sensory deficit.   Psychiatric:         Attention and Perception: Attention and perception normal.         Mood and Affect: Mood and affect normal.         Speech: Speech normal.         Behavior: Behavior normal.         Thought Content: Thought content normal.         Cognition and Memory: Cognition and memory normal.         Judgment: Judgment normal.       Assessment:     1. Renovascular hypertension    2. Hypercholesterolemia    3. Overweight    4. End stage kidney disease        Plan:     1. Hypertension   1997: Diagnosed with severe hypertension.   On amlodipine 10 mg Q24 and losartan 100 mg Q24.   Keeping log at home.   Appears well controlled.    2. Hypercholesterolemia   1985: Began statin.   On atorvastatin 20 mg Q24.   6/17/2016: Chol 109. HDL 31. LDL 43. .   On atorvastatin 40 mg Q24.   Favorable lipid panel.    3. Overweight   4/19/2021: Weight 93 kg. BMI 29.   Encouraged to lose weight.    4. End Stage Kidney Disease   9/18/1997: Began dialysis. [facility]   Dr. Tammi Agarwal.    5. Anemia   Due to end  stage renal disease and nephrectomies.   Receiving erythropoietin injections about every two weeks.    6. Primary Care   Dr. Tammi Agarwal.    F/u 6 months.    Manuel Vides M.D.

## 2023-04-18 ENCOUNTER — OFFICE VISIT (OUTPATIENT)
Dept: CARDIOLOGY | Facility: CLINIC | Age: 63
End: 2023-04-18
Attending: INTERNAL MEDICINE
Payer: MEDICARE

## 2023-04-18 VITALS
HEIGHT: 71 IN | DIASTOLIC BLOOD PRESSURE: 74 MMHG | SYSTOLIC BLOOD PRESSURE: 120 MMHG | BODY MASS INDEX: 29.4 KG/M2 | HEART RATE: 65 BPM | WEIGHT: 210 LBS | OXYGEN SATURATION: 99 %

## 2023-04-18 DIAGNOSIS — Z99.2 ANEMIA IN CHRONIC KIDNEY DISEASE, ON CHRONIC DIALYSIS: ICD-10-CM

## 2023-04-18 DIAGNOSIS — D63.1 ANEMIA IN CHRONIC KIDNEY DISEASE, ON CHRONIC DIALYSIS: ICD-10-CM

## 2023-04-18 DIAGNOSIS — E66.3 OVERWEIGHT: ICD-10-CM

## 2023-04-18 DIAGNOSIS — I15.0 RENOVASCULAR HYPERTENSION: ICD-10-CM

## 2023-04-18 DIAGNOSIS — E78.00 HYPERCHOLESTEROLEMIA: ICD-10-CM

## 2023-04-18 DIAGNOSIS — N18.6 END STAGE KIDNEY DISEASE: ICD-10-CM

## 2023-04-18 DIAGNOSIS — Z86.73 HISTORY OF CEREBROVASCULAR ACCIDENT: ICD-10-CM

## 2023-04-18 DIAGNOSIS — N18.6 ANEMIA IN CHRONIC KIDNEY DISEASE, ON CHRONIC DIALYSIS: ICD-10-CM

## 2023-04-18 PROCEDURE — 99214 OFFICE O/P EST MOD 30 MIN: CPT | Mod: S$PBB,25,, | Performed by: INTERNAL MEDICINE

## 2023-04-18 PROCEDURE — 99214 PR OFFICE/OUTPT VISIT, EST, LEVL IV, 30-39 MIN: ICD-10-PCS | Mod: S$PBB,25,, | Performed by: INTERNAL MEDICINE

## 2023-04-18 PROCEDURE — 99999 PR PBB SHADOW E&M-EST. PATIENT-LVL III: CPT | Mod: PBBFAC,,, | Performed by: INTERNAL MEDICINE

## 2023-04-18 PROCEDURE — 99213 OFFICE O/P EST LOW 20 MIN: CPT | Mod: PBBFAC | Performed by: INTERNAL MEDICINE

## 2023-04-18 PROCEDURE — 93005 ELECTROCARDIOGRAM TRACING: CPT | Mod: PBBFAC | Performed by: INTERNAL MEDICINE

## 2023-04-18 PROCEDURE — 93010 ELECTROCARDIOGRAM REPORT: CPT | Mod: ,,, | Performed by: INTERNAL MEDICINE

## 2023-04-18 PROCEDURE — 93005 ELECTROCARDIOGRAM TRACING: CPT

## 2023-04-18 PROCEDURE — 93010 EKG 12-LEAD: ICD-10-PCS | Mod: ,,, | Performed by: INTERNAL MEDICINE

## 2023-04-18 PROCEDURE — 99999 PR PBB SHADOW E&M-EST. PATIENT-LVL III: ICD-10-PCS | Mod: PBBFAC,,, | Performed by: INTERNAL MEDICINE

## 2023-04-18 RX ORDER — LOSARTAN POTASSIUM 100 MG/1
100 TABLET ORAL DAILY
Qty: 90 TABLET | Refills: 3 | Status: SHIPPED | OUTPATIENT
Start: 2023-04-18

## 2023-04-18 RX ORDER — AMLODIPINE BESYLATE 10 MG/1
10 TABLET ORAL DAILY
Qty: 90 TABLET | Refills: 3 | Status: SHIPPED | OUTPATIENT
Start: 2023-04-18 | End: 2023-09-13 | Stop reason: SDUPTHER

## 2023-04-18 RX ORDER — ASPIRIN 81 MG/1
81 TABLET ORAL DAILY
Qty: 90 TABLET | Refills: 3 | Status: SHIPPED | OUTPATIENT
Start: 2023-04-18 | End: 2023-10-17

## 2023-04-18 RX ORDER — ATORVASTATIN CALCIUM 40 MG/1
40 TABLET, FILM COATED ORAL DAILY
Qty: 90 TABLET | Refills: 3 | Status: SHIPPED | OUTPATIENT
Start: 2023-04-18

## 2023-04-18 NOTE — PROGRESS NOTES
Subjective:     Glenn Collier is a 62 y.o. male with hypertension and hypercholesterolemia. He is overweight. He developed end stage renal disease due to hypertension in 1997 and then began hemodialysis. Access is a graft in the right thigh. He has undergone bilateral nephrectomies. He had a large pericardial effusion in 2006 that resolved with intensified dialysis. He receives erythropoietin shots about every two weeks. There has been no issues with his access in the right thigh. He says he once had a very light stroke. He denies any exertional chest pain or exertional dyspnea. He denies any claudication. No palpitations or weak spells. No bleeding. No issues with any of his prescribed medications. Feeling well overall.      Hypertension  This is a chronic problem. The current episode started more than 1 year ago. The problem is unchanged. The problem is controlled (usually 120-130/60-80 mmHg at home). Pertinent negatives include no anxiety, blurred vision, chest pain, headaches, malaise/fatigue, neck pain, orthopnea, palpitations, peripheral edema, PND, shortness of breath or sweats. Identifiable causes of hypertension include chronic renal disease.   Hyperlipidemia  This is a chronic problem. The current episode started more than 1 year ago. The problem is controlled. Recent lipid tests were reviewed and are normal. Exacerbating diseases include chronic renal disease. He has no history of diabetes, hypothyroidism, liver disease, obesity or nephrotic syndrome. Pertinent negatives include no chest pain, focal sensory loss, focal weakness, leg pain, myalgias or shortness of breath.     Review of Systems   Constitutional: Negative for chills, fever and malaise/fatigue.   HENT:  Negative for nosebleeds.    Eyes:  Negative for blurred vision, double vision, vision loss in left eye and vision loss in right eye.   Cardiovascular:  Negative for chest pain, claudication, dyspnea on exertion, irregular heartbeat, leg  swelling, near-syncope, orthopnea, palpitations, paroxysmal nocturnal dyspnea and syncope.   Respiratory:  Negative for cough, hemoptysis, shortness of breath and wheezing.    Endocrine: Negative for cold intolerance and heat intolerance.   Hematologic/Lymphatic: Negative for bleeding problem. Does not bruise/bleed easily.   Skin:  Negative for color change and rash.   Musculoskeletal:  Negative for back pain, falls, muscle weakness, myalgias and neck pain.   Gastrointestinal:  Negative for heartburn, hematemesis, hematochezia, hemorrhoids, jaundice, melena, nausea and vomiting.   Neurological:  Negative for dizziness, focal weakness, headaches, light-headedness, loss of balance, numbness, tremors, vertigo and weakness.   Psychiatric/Behavioral:  Negative for altered mental status, depression and memory loss. The patient is not nervous/anxious.    Allergic/Immunologic: Negative for hives and persistent infections.       Current Outpatient Medications on File Prior to Visit   Medication Sig Dispense Refill    albuterol sulfate 90 mcg/actuation aebs Inhale 180 mcg into the lungs every 4 (four) hours.      amlodipine (NORVASC) 10 MG tablet Take 10 mg by mouth once daily.      aspirin (ECOTRIN) 81 MG EC tablet Take 81 mg by mouth once daily.      atorvastatin (LIPITOR) 20 MG tablet Take 40 mg by mouth once daily.       B COMPLEX & C NO.20/FOLIC ACID (NEPHROCAPS ORAL) Take 1 capsule by mouth once daily.      cinacalcet (SENSIPAR) 60 MG Tab Take 60 mg by mouth once daily. Take 60 mg daily at bedtime      clopidogreL (PLAVIX) 75 mg tablet Take 75 mg by mouth once daily.      ERGOCALCIFEROL, VITAMIN D2, (VITAMIN D ORAL) Take 2,000 mg by mouth once daily.      latanoprost 0.005 % ophthalmic solution 1 drop every evening.      losartan (COZAAR) 100 MG tablet Take 100 mg by mouth once daily.      sevelamer carbonate (RENVELA) 800 mg Tab Take 800 mg by mouth 3 (three) times daily with meals.      tadalafil (CIALIS) 5 MG tablet  "Take 5 mg by mouth daily as needed for Erectile Dysfunction.       No current facility-administered medications on file prior to visit.       BP (!) 142/84 (BP Location: Left arm, Patient Position: Sitting, BP Method: Medium (Manual))   Pulse 65   Ht 5' 10.98" (1.803 m)   Wt 95.2 kg (209 lb 15.8 oz)   SpO2 99%   BMI 29.30 kg/m²        Objective:     Physical Exam  Constitutional:       General: He is not in acute distress.     Appearance: Normal appearance. He is well-developed. He is not ill-appearing, toxic-appearing or diaphoretic.   HENT:      Head: Normocephalic and atraumatic.      Nose: Nose normal.   Eyes:      General:         Right eye: No discharge.         Left eye: No discharge.      Conjunctiva/sclera:      Right eye: Right conjunctiva is not injected.      Left eye: Left conjunctiva is not injected.      Pupils: Pupils are equal.      Right eye: Pupil is round.      Left eye: Pupil is round.   Neck:      Thyroid: No thyromegaly.      Vascular: No carotid bruit or JVD.   Cardiovascular:      Rate and Rhythm: Normal rate and regular rhythm. No extrasystoles are present.     Chest Wall: PMI is not displaced.      Pulses:           Radial pulses are 2+ on the right side and 2+ on the left side.        Femoral pulses are 2+ on the right side and 2+ on the left side.       Dorsalis pedis pulses are 1+ on the right side and 1+ on the left side.        Posterior tibial pulses are 1+ on the right side and 1+ on the left side.      Heart sounds: S1 normal and S2 normal. Murmur heard.   Harsh midsystolic murmur is present at the upper right sternal border. Graft right upper thigh.  Multiple fistulas and scars right arm.  No edema of arms.     Gallop present. S4 sounds present.   Pulmonary:      Effort: Pulmonary effort is normal.      Breath sounds: Normal breath sounds.   Abdominal:      Palpations: Abdomen is soft.      Tenderness: There is no abdominal tenderness.   Musculoskeletal:      Cervical back: " Neck supple.      Right ankle: No swelling, deformity or ecchymosis.      Left ankle: No swelling, deformity or ecchymosis.   Lymphadenopathy:      Head:      Right side of head: No submandibular adenopathy.      Left side of head: No submandibular adenopathy.      Cervical: No cervical adenopathy.   Skin:     General: Skin is warm and dry.      Findings: No rash.      Nails: There is no clubbing.   Neurological:      General: No focal deficit present.      Mental Status: He is alert and oriented to person, place, and time. He is not disoriented.      Cranial Nerves: No cranial nerve deficit.      Sensory: No sensory deficit.   Psychiatric:         Attention and Perception: Attention and perception normal.         Mood and Affect: Mood and affect normal.         Speech: Speech normal.         Behavior: Behavior normal.         Thought Content: Thought content normal.         Cognition and Memory: Cognition and memory normal.         Judgment: Judgment normal.       Assessment:     1. Renovascular hypertension    2. Hypercholesterolemia    3. Overweight    4. End stage kidney disease    5. Anemia in chronic kidney disease, on chronic dialysis        Plan:     History of Cerebrovascular Accident   Tells me once was told he had a light stroke. No sequela.   On aspirin 81 mg Q24 and clopidogrel 75 mg Q24.   4/19/2023: Aspirin 81 mg Q24 to be discontinued.    2. Hypertension   1997: Diagnosed with severe hypertension.   On amlodipine 10 mg Q24 and losartan 100 mg Q24.   Keeping log at home.   Appears well controlled.    3. Hypercholesterolemia   1985: Began statin.   On atorvastatin 20 mg Q24.   6/17/2016: Chol 109. HDL 31. LDL 43. .   2/20/2023: Chol 122. HDL 49. LDL 46. .   On atorvastatin 40 mg Q24.   Very favorable lipid panel.    4. Overweight   4/19/2021: Weight 93 kg. BMI 29.   Encouraged to lose weight.    5. End Stage Kidney Disease   9/18/1997: Began dialysis. [facility]   Dr. Steve Garg.     6.  Anemia   Due to end stage renal disease and nephrectomies.   Receiving erythropoietin injections about every two weeks.    7. Primary Care   Dr. Steve Garg.    F/u 6 months.    Manuel Vides M.D.

## 2023-04-19 ENCOUNTER — TELEPHONE (OUTPATIENT)
Dept: CARDIOLOGY | Facility: CLINIC | Age: 63
End: 2023-04-19
Payer: MEDICARE

## 2023-04-19 PROBLEM — Z86.73 HISTORY OF CEREBROVASCULAR ACCIDENT: Status: ACTIVE | Noted: 2023-04-19

## 2023-04-19 RX ORDER — CLOPIDOGREL BISULFATE 75 MG/1
75 TABLET ORAL DAILY
Qty: 90 TABLET | Refills: 3 | Status: SHIPPED | OUTPATIENT
Start: 2023-04-19

## 2023-04-19 NOTE — TELEPHONE ENCOUNTER
Plavix was discontinued yesterday.  Pt states he cannot stop Plavix.    PLEASE CALL PATIENT TO DISCUSS.      ----- Message from Elodia Turpin sent at 4/19/2023  2:45 PM CDT -----  Name of Who is Calling: POLLY CHRISTENSEN [[id number]]            What is the request in detail: Patient is requesting call back for important question about blood thinner              Can the clinic reply by [facility]: yes              What Number to Call Back if not in [facility]: [phone]

## 2023-08-07 ENCOUNTER — TELEPHONE (OUTPATIENT)
Dept: NEPHROLOGY | Facility: CLINIC | Age: 63
End: 2023-08-07
Payer: MEDICARE

## 2023-08-07 NOTE — TELEPHONE ENCOUNTER
[facility], [place] P [facility] Staff  Caller: Unspecified (Today, 10:49 AM)  RX Name, Strength, Sig:  RX prev prescribed by an outside provider - Dr. Garg is pt's new provider and caller states that [facility] will not refill the current RX bc the provider has changed. Pt is out of meds.     Virt-Caps - 1 cap by mouth daily / v260     Caller: Brandie       Preferred Pharmacy with phone number:     [id number] [place] [address] [facility] [place] [address] [facility] [id number]   Phone: [phone] Fax: [phone]     Ordering Provider: Forest         Contact Preference: [phone]     Addl info:

## 2023-09-13 DIAGNOSIS — I15.0 RENOVASCULAR HYPERTENSION: ICD-10-CM

## 2023-09-13 RX ORDER — AMLODIPINE BESYLATE 10 MG/1
10 TABLET ORAL DAILY
Qty: 90 TABLET | Refills: 3 | Status: SHIPPED | OUTPATIENT
Start: 2023-09-13

## 2023-09-13 RX ORDER — DOXAZOSIN 2 MG/1
2 TABLET ORAL NIGHTLY
Qty: 30 TABLET | Refills: 3 | Status: SHIPPED | OUTPATIENT
Start: 2023-09-13 | End: 2024-09-12

## 2023-10-17 ENCOUNTER — OFFICE VISIT (OUTPATIENT)
Dept: CARDIOLOGY | Facility: CLINIC | Age: 63
End: 2023-10-17
Attending: INTERNAL MEDICINE
Payer: MEDICARE

## 2023-10-17 VITALS
HEART RATE: 57 BPM | WEIGHT: 199.94 LBS | BODY MASS INDEX: 27.99 KG/M2 | DIASTOLIC BLOOD PRESSURE: 70 MMHG | SYSTOLIC BLOOD PRESSURE: 125 MMHG | HEIGHT: 71 IN | OXYGEN SATURATION: 99 %

## 2023-10-17 DIAGNOSIS — N18.6 CHRONIC KIDNEY DISEASE WITH END STAGE RENAL FAILURE ON DIALYSIS: ICD-10-CM

## 2023-10-17 DIAGNOSIS — N18.6 ANEMIA IN CHRONIC KIDNEY DISEASE, ON CHRONIC DIALYSIS: ICD-10-CM

## 2023-10-17 DIAGNOSIS — D63.1 ANEMIA IN CHRONIC KIDNEY DISEASE, ON CHRONIC DIALYSIS: ICD-10-CM

## 2023-10-17 DIAGNOSIS — E78.00 HYPERCHOLESTEROLEMIA: ICD-10-CM

## 2023-10-17 DIAGNOSIS — Z86.73 HISTORY OF CEREBROVASCULAR ACCIDENT: ICD-10-CM

## 2023-10-17 DIAGNOSIS — I15.0 RENOVASCULAR HYPERTENSION: ICD-10-CM

## 2023-10-17 DIAGNOSIS — Z99.2 ANEMIA IN CHRONIC KIDNEY DISEASE, ON CHRONIC DIALYSIS: ICD-10-CM

## 2023-10-17 DIAGNOSIS — E66.3 OVERWEIGHT: ICD-10-CM

## 2023-10-17 DIAGNOSIS — Z99.2 CHRONIC KIDNEY DISEASE WITH END STAGE RENAL FAILURE ON DIALYSIS: ICD-10-CM

## 2023-10-17 PROCEDURE — 99214 OFFICE O/P EST MOD 30 MIN: CPT | Mod: S$PBB,,, | Performed by: INTERNAL MEDICINE

## 2023-10-17 PROCEDURE — 99999 PR PBB SHADOW E&M-EST. PATIENT-LVL IV: ICD-10-PCS | Mod: PBBFAC,,, | Performed by: INTERNAL MEDICINE

## 2023-10-17 PROCEDURE — 99999 PR PBB SHADOW E&M-EST. PATIENT-LVL IV: CPT | Mod: PBBFAC,,, | Performed by: INTERNAL MEDICINE

## 2023-10-17 PROCEDURE — 99214 PR OFFICE/OUTPT VISIT, EST, LEVL IV, 30-39 MIN: ICD-10-PCS | Mod: S$PBB,,, | Performed by: INTERNAL MEDICINE

## 2023-10-17 PROCEDURE — 99214 OFFICE O/P EST MOD 30 MIN: CPT | Mod: PBBFAC | Performed by: INTERNAL MEDICINE

## 2023-10-17 RX ORDER — NEPHROCAP 1 MG
1 CAP ORAL
COMMUNITY
Start: 2023-08-07

## 2023-10-17 NOTE — PROGRESS NOTES
Subjective:     Glenn Collier is a 62 y.o. male with hypertension and hypercholesterolemia. He is overweight. He developed end stage renal disease due to hypertension in 1997 and then began hemodialysis. Access is a graft in the right thigh. He has undergone bilateral nephrectomies. He had a large pericardial effusion in 2006 that resolved with intensified dialysis. He receives erythropoietin shots about every two weeks. There has been no issues with his access in the right thigh. He says he once had a very light stroke. He denies any exertional chest pain or exertional dyspnea. He denies any claudication. No palpitations or weak spells. No bleeding. No issues with any of his prescribed medications. Feeling well overall.      Hypertension  This is a chronic problem. The current episode started more than 1 year ago. The problem is unchanged. The problem is controlled (usually 120-130/60-80 mmHg at home). Pertinent negatives include no anxiety, blurred vision, chest pain, headaches, malaise/fatigue, neck pain, orthopnea, palpitations, peripheral edema, PND, shortness of breath or sweats. Identifiable causes of hypertension include chronic renal disease.   Hyperlipidemia  This is a chronic problem. The current episode started more than 1 year ago. The problem is controlled. Recent lipid tests were reviewed and are normal. Exacerbating diseases include chronic renal disease. He has no history of diabetes, hypothyroidism, liver disease, obesity or nephrotic syndrome. Pertinent negatives include no chest pain, focal sensory loss, focal weakness, leg pain, myalgias or shortness of breath.   Cerebrovascular Accident  Pertinent negatives include no chest pain, chills, coughing, fever, headaches, myalgias, nausea, neck pain, numbness, rash, vertigo, vomiting or weakness.       Review of Systems   Constitutional: Negative for chills, fever and malaise/fatigue.   HENT:  Negative for nosebleeds.    Eyes:  Negative for blurred  vision, double vision, vision loss in left eye and vision loss in right eye.   Cardiovascular:  Negative for chest pain, claudication, dyspnea on exertion, irregular heartbeat, leg swelling, near-syncope, orthopnea, palpitations, paroxysmal nocturnal dyspnea and syncope.   Respiratory:  Negative for cough, hemoptysis, shortness of breath and wheezing.    Endocrine: Negative for cold intolerance and heat intolerance.   Hematologic/Lymphatic: Negative for bleeding problem. Does not bruise/bleed easily.   Skin:  Negative for color change and rash.   Musculoskeletal:  Negative for back pain, falls, muscle weakness, myalgias and neck pain.   Gastrointestinal:  Negative for heartburn, hematemesis, hematochezia, hemorrhoids, jaundice, melena, nausea and vomiting.   Neurological:  Negative for dizziness, focal weakness, headaches, light-headedness, loss of balance, numbness, tremors, vertigo and weakness.   Psychiatric/Behavioral:  Negative for altered mental status, depression and memory loss. The patient is not nervous/anxious.    Allergic/Immunologic: Negative for hives and persistent infections.       Current Outpatient Medications on File Prior to Visit   Medication Sig Dispense Refill    albuterol sulfate 90 mcg/actuation aebs Inhale 180 mcg into the lungs every 4 (four) hours.      amLODIPine (NORVASC) 10 MG tablet Take 1 tablet (10 mg total) by mouth once daily. 90 tablet 3    atorvastatin (LIPITOR) 40 MG tablet Take 1 tablet (40 mg total) by mouth once daily. 90 tablet 3    B COMPLEX & C NO.20/FOLIC ACID (NEPHROCAPS ORAL) Take 1 capsule by mouth once daily.      cinacalcet (SENSIPAR) 60 MG Tab Take 60 mg by mouth once daily. Take 60 mg daily at bedtime      clopidogreL (PLAVIX) 75 mg tablet Take 1 tablet (75 mg total) by mouth once daily. 90 tablet 3    ERGOCALCIFEROL, VITAMIN D2, (VITAMIN D ORAL) Take 2,000 mg by mouth once daily.      latanoprost 0.005 % ophthalmic solution 1 drop every evening.      losartan  "(COZAAR) 100 MG tablet Take 1 tablet (100 mg total) by mouth once daily. 90 tablet 3    sevelamer carbonate (RENVELA) 800 mg Tab Take 800 mg by mouth 3 (three) times daily with meals.      acyclovir (ZOVIRAX) 400 MG tablet Take 1 tablet (400 mg total) by mouth 3 (three) times daily as needed (Take for 5 days for this episode). 15 tablet 0    aspirin (ECOTRIN) 81 MG EC tablet Take 1 tablet (81 mg total) by mouth once daily. (Patient not taking: Reported on 10/17/2023) 90 tablet 3    doxazosin (CARDURA) 2 MG tablet Take 1 tablet (2 mg total) by mouth every evening. (Patient not taking: Reported on 10/17/2023) 30 tablet 3    tadalafil (CIALIS) 5 MG tablet Take 5 mg by mouth daily as needed for Erectile Dysfunction.      VIRT-CAPS 1 mg Cap Take 1 capsule by mouth.       Current Facility-Administered Medications on File Prior to Visit   Medication Dose Route Frequency Provider Last Rate Last Admin    [DISCONTINUED] heparin (porcine) injection  500 Units/hr Intravenous Continuous Steve Garg, DO 0.5 mL/hr at 10/16/23 0700 500 Units/hr at 10/16/23 0700       /70 Comment: average at home  Pulse (!) 57   Ht 5' 11" (1.803 m)   Wt 90.7 kg (199 lb 15.3 oz)   SpO2 99%   BMI 27.89 kg/m²      Objective:     Physical Exam  Constitutional:       General: He is not in acute distress.     Appearance: Normal appearance. He is well-developed. He is not ill-appearing, toxic-appearing or diaphoretic.   HENT:      Head: Normocephalic and atraumatic.      Nose: Nose normal.   Eyes:      General:         Right eye: No discharge.         Left eye: No discharge.      Conjunctiva/sclera:      Right eye: Right conjunctiva is not injected.      Left eye: Left conjunctiva is not injected.      Pupils: Pupils are equal.      Right eye: Pupil is round.      Left eye: Pupil is round.   Neck:      Thyroid: No thyromegaly.      Vascular: No carotid bruit or JVD.   Cardiovascular:      Rate and Rhythm: Normal rate and regular rhythm. No " extrasystoles are present.     Chest Wall: PMI is not displaced.      Pulses:           Radial pulses are 2+ on the right side and 2+ on the left side.        Femoral pulses are 2+ on the right side and 2+ on the left side.       Dorsalis pedis pulses are 1+ on the right side and 1+ on the left side.        Posterior tibial pulses are 1+ on the right side and 1+ on the left side.      Heart sounds: S1 normal and S2 normal. Murmur heard.      Harsh midsystolic murmur is present at the upper right sternal border. Graft right upper thigh.  Multiple fistulas and scars right arm.  No edema of arms.      Gallop present. S4 sounds present.   Pulmonary:      Effort: Pulmonary effort is normal.      Breath sounds: Normal breath sounds.   Abdominal:      Palpations: Abdomen is soft.      Tenderness: There is no abdominal tenderness.   Musculoskeletal:      Cervical back: Neck supple.      Right ankle: No swelling, deformity or ecchymosis.      Left ankle: No swelling, deformity or ecchymosis.   Lymphadenopathy:      Head:      Right side of head: No submandibular adenopathy.      Left side of head: No submandibular adenopathy.      Cervical: No cervical adenopathy.   Skin:     General: Skin is warm and dry.      Findings: No rash.      Nails: There is no clubbing.   Neurological:      General: No focal deficit present.      Mental Status: He is alert and oriented to person, place, and time. He is not disoriented.      Cranial Nerves: No cranial nerve deficit.      Sensory: No sensory deficit.   Psychiatric:         Attention and Perception: Attention and perception normal.         Mood and Affect: Mood and affect normal.         Speech: Speech normal.         Behavior: Behavior normal.         Thought Content: Thought content normal.         Cognition and Memory: Cognition and memory normal.         Judgment: Judgment normal.         Assessment:     1. History of cerebrovascular accident    2. Renovascular hypertension    3.  Hypercholesterolemia    4. Overweight    5. Chronic kidney disease with end stage renal failure on dialysis    6. Anemia in chronic kidney disease, on chronic dialysis        Plan:     History of Cerebrovascular Accident   Tells me once was told he had a light stroke. No sequela.   4/19/2023: Aspirin 81 mg Q24 was discontinued.   On clopidogrel 75 mg Q24.   No bleeding.     2. Hypertension   1997: Diagnosed with severe hypertension.   On amlodipine 10 mg Q24 and losartan 100 mg Q24.   Keeping log at home.   Appears well controlled.    3. Hypercholesterolemia   1985: Began statin.   On atorvastatin 20 mg Q24.   6/17/2016: Chol 109. HDL 31. LDL 43. .   2/20/2023: Chol 122. HDL 49. LDL 46. .   On atorvastatin 40 mg Q24.   Very favorable lipid panel.    4. Overweight   4/19/2021: Weight 93 kg. BMI 29.   Encouraged to lose weight.    5. End Stage Kidney Disease on Dialysis   9/18/1997: Began dialysis. [facility]   Dr. Steve Garg.     6. Anemia   Due to end stage renal disease and nephrectomies.   Receiving erythropoietin injections about every two weeks.    7. Primary Care   Dr. Steve Garg.    F/u 6 months.    Manuel Vides M.D.

## 2023-11-19 ENCOUNTER — HOSPITAL ENCOUNTER (EMERGENCY)
Facility: OTHER | Age: 63
Discharge: HOME | End: 2023-11-19
Attending: EMERGENCY MEDICINE
Payer: MEDICARE

## 2023-11-19 VITALS
RESPIRATION RATE: 18 BRPM | BODY MASS INDEX: 28 KG/M2 | OXYGEN SATURATION: 98 % | HEART RATE: 72 BPM | SYSTOLIC BLOOD PRESSURE: 113 MMHG | TEMPERATURE: 98 F | HEIGHT: 71 IN | WEIGHT: 200 LBS | DIASTOLIC BLOOD PRESSURE: 56 MMHG

## 2023-11-19 DIAGNOSIS — M79.642 LEFT HAND PAIN: Primary | ICD-10-CM

## 2023-11-19 PROCEDURE — 99283 EMERGENCY DEPT VISIT LOW MDM: CPT

## 2023-11-19 RX ORDER — IBUPROFEN 600 MG/1
600 TABLET ORAL EVERY 6 HOURS PRN
Qty: 20 TABLET | Refills: 0 | Status: SHIPPED | OUTPATIENT
Start: 2023-11-19

## 2023-11-19 NOTE — ED TRIAGE NOTES
Pt reports to ED with bilateral hand pain that has been going on for 2 months now. Pt had carpel tunnel surgery on both hands and states the pain has come back. Reports he cannot sleep at night because the pain wakes him up. Pt is currently on dialysis. Aaox4.

## 2023-11-19 NOTE — ED PROVIDER NOTES
"  Source of History:  Patient    Chief complaint:  Hand Pain (Pt reporting pain to bilateral hands x 30 days, no relief from otc meds. Pt reports surgery on hands 20 years ago and states that he has been having intermittent issues since procedure. )      HPI:  Glenn Collier is a 62 y.o. male presenting with  left hand pain x2 months.  Patient states that he has been having intermittent left hand pain and right hand numbness for the past 2 months.  States that symptoms began after he worked outside rebuilding his deck this summer.  He reports that the symptoms are intermittent, worsened at night.  He reports an isolated episode of swelling, denies any swelling of the hands today.  Denies any fever, chills, nausea, vomiting.  Denies any acute injury to the hand.  He does report that he had surgery on his bilateral hands 20 years ago for carpal tunnel syndrome.  States that he no longer follows with a hand surgeon because his retired.  Patient states that his left hand currently hurts, denies any numbness in his right hand currently.    This is the extent to the patients complaints today here in the emergency department.    ROS: As per HPI and below:  Constitutional: No fever.  No chills.  Eyes: No visual changes.   ENT: No sore throat. No ear pain.  Urinary: No abnormal urination.  MSK:  Positive for left hand pain, right hand numbness.  Integument: No rashes or lesions.    Review of patient's allergies indicates:   Allergen Reactions    Minoxidil Itching, Swelling and Other (See Comments)     " drops blood pressure."        PMH:  As per HPI and below:  Past Medical History:   Diagnosis Date    Cancer     RCC s/p bilateral nephrectomy    Cardiomegaly     Dialysis patient     Encounter for blood transfusion     Glaucoma     H/O colonoscopy     Hyperlipemia     Hypertension     Renal disorder      Past Surgical History:   Procedure Laterality Date    DIALYSIS FISTULA CREATION      in arm and groin on R side     KIDNEY " "SURGERY      KIDNEY TRANSPLANT  kidney cancer    NEPHRECTOMY Bilateral Right 2009;  Left 2010    RCC both sides       Social History     Tobacco Use    Smoking status: Never    Smokeless tobacco: Never   Substance Use Topics    Alcohol use: No    Drug use: No       Physical Exam:    BP (!) 113/56 (BP Location: Left arm, Patient Position: Sitting)   Pulse 72   Temp 98.2 °F (36.8 °C) (Oral)   Resp 18   Ht 5' 11" (1.803 m)   Wt 90.7 kg (200 lb)   SpO2 98%   BMI 27.89 kg/m²   Nursing note and vital signs reviewed.  Constitutional: No acute distress.  Well-appearing gentleman, dressed in a suit, speaking in full sentences.  Eyes: No conjunctival injection.  Extraocular muscles are intact.  ENT: Normal phonation.  Chest:  No respiratory distress.  Musculoskeletal:  Bilateral hands with no visible deformity, swelling, overlying erythema or warmth present.  2+ radial pulses present bilaterally with capillary refill less than 2 seconds.  Bilateral hands neurovascularly intact.   strength 5/5 bilaterally.  Full active range of motion of bilateral hands intact.  There is tenderness to palpation along the thenar eminence of the left hand and diffusely along the MCP joints on the palmar surface of the left hand.  No tenderness to palpation of the right hand.  Skin: No rashes seen.  Good turgor.  No abrasions.  No ecchymoses.  Psych: Appropriate, conversant.        I decided to obtain the patient's medical records.    Results for orders placed or performed in visit on 11/15/23   Hemoglobin   Result Value Ref Range    Hemoglobin 9.9 (L) 14.0 - 18.0 g/dL    Hemoglobin x 3 29.7 (L) 42.0 - 54.0 %   Hematocrit   Result Value Ref Range    Hematocrit 30.5 (L) 42.0 - 52.0 %     Imaging Results    None         MDM:    62 y.o. male with past medical history of hypertension, hyperlipidemia, kidney cancer status post bilateral nephrectomy on hemodialysis, history of CVA presenting for evaluation of left hand pain times 1-2 months. "  Patient afebrile and hemodynamically stable.  Exam findings as described above.  No history of trauma and full active range of motion of hands intact, no indication for imaging.  There is diffuse tenderness along the thenar and MCP joints at the palmar aspect of the left hand.  No evidence of infectious etiology.  Discussed option of anti-inflammatories with patient. He denied any history of gastric ulcers or CAD.  Given that he is status post bilateral nephrectomy on hemodialysis, he can tolerate short course of ibuprofen for pain, no risk of renal injury.  Discussed risk factors with patient and that medication should not be used long-term.  He verbalized understanding.  Referral to hand surgeon placed for further evaluation.  Patient was given ER return precautions. I see no indication of an emergent process beyond that addressed during our encounter but have duly counseled the patient/family regarding the need for prompt follow-up as well as the indications that should prompt immediate return to the emergency room should new or worrisome developments occur. I discussed the ED work up and diagnostic findings with the patient. The patient/family has been provided with verbal and printed direction regarding our final diagnosis(es) as well as instructions regarding use of OTC and/or Rx medications intended to manage the patient's aforementioned conditions. The patient/family verbalized an understanding. The patient/family is asked if there are any questions or concerns. We discuss the case, until all issues are addressed to the patient/family's satisfaction. Patient/family understands and is agreeable to the plan. I discussed this case with my attending provider, Dr. Simmons, who was in agreement with plan.                 Diagnostic Impression:    1. Left hand pain         ED Disposition Condition    Discharge Stable            ED Prescriptions       Medication Sig Dispense Start Date End Date Auth. Provider     ibuprofen (ADVIL,MOTRIN) 600 MG tablet Take 1 tablet (600 mg total) by mouth every 6 (six) hours as needed for Pain. 20 tablet 11/19/2023 -- Katiuska Zamarripa PA-C          Follow-up Information       Follow up With Specialties Details Why Contact Info Additional Information    [facility] Schedule an appointment as soon as possible for a visit in 2 days  [address], [address]  [facility] [address] [phone] [facility], [address]th Floor Please park in [place] [place] and use [place] elevators    [facility] Emergency Dept Emergency Medicine Go to  If symptoms worsen [address] [address] [phone]             Please pardon typos or dictation errors, as this note was transcribed using [employer] fluency direct dictation software.      Katiuska Zamarripa PA-C  11/19/23 [id number]

## 2023-11-19 NOTE — DISCHARGE INSTRUCTIONS
You were seen in the ER for evaluation of hand pain.  I am prescribing you a short course of anti-inflammatories that you may take to aid in the pain.  I am also referring you to a hand surgeon for further evaluation, I have attached the hand clinic information to your paperwork for you to call for follow up appointment.  Please use rest, ice, compression, and elevation to aid in the swelling and pain as well.  You may return to the ER for any new or worsening symptoms.

## 2023-11-22 ENCOUNTER — TELEPHONE (OUTPATIENT)
Dept: ORTHOPEDICS | Facility: CLINIC | Age: 63
End: 2023-11-22
Payer: MEDICARE

## 2023-12-08 DIAGNOSIS — M79.642 LEFT HAND PAIN: Primary | ICD-10-CM

## 2023-12-20 ENCOUNTER — HOSPITAL ENCOUNTER (OUTPATIENT)
Dept: RADIOLOGY | Facility: OTHER | Age: 63
Discharge: HOME | End: 2023-12-20
Attending: ORTHOPAEDIC SURGERY
Payer: MEDICARE

## 2023-12-20 ENCOUNTER — OFFICE VISIT (OUTPATIENT)
Dept: ORTHOPEDICS | Facility: CLINIC | Age: 63
End: 2023-12-20
Payer: MEDICARE

## 2023-12-20 DIAGNOSIS — N18.6 CHRONIC KIDNEY DISEASE WITH END STAGE RENAL FAILURE ON DIALYSIS: ICD-10-CM

## 2023-12-20 DIAGNOSIS — G56.03 BILATERAL CARPAL TUNNEL SYNDROME: ICD-10-CM

## 2023-12-20 DIAGNOSIS — M79.642 LEFT HAND PAIN: ICD-10-CM

## 2023-12-20 DIAGNOSIS — Z99.2 CHRONIC KIDNEY DISEASE WITH END STAGE RENAL FAILURE ON DIALYSIS: ICD-10-CM

## 2023-12-20 DIAGNOSIS — G89.29 CHRONIC PAIN OF LEFT HAND: ICD-10-CM

## 2023-12-20 DIAGNOSIS — M65.342 TRIGGER RING FINGER OF LEFT HAND: Primary | ICD-10-CM

## 2023-12-20 DIAGNOSIS — M79.642 CHRONIC PAIN OF LEFT HAND: ICD-10-CM

## 2023-12-20 PROCEDURE — 99999 PR PBB SHADOW E&M-EST. PATIENT-LVL III: ICD-10-PCS | Mod: PBBFAC,,, | Performed by: ORTHOPAEDIC SURGERY

## 2023-12-20 PROCEDURE — 99204 PR OFFICE/OUTPT VISIT, NEW, LEVL IV, 45-59 MIN: ICD-10-PCS | Mod: S$PBB,25,, | Performed by: ORTHOPAEDIC SURGERY

## 2023-12-20 PROCEDURE — 73130 X-RAY EXAM OF HAND: CPT | Mod: TC,FY,LT

## 2023-12-20 PROCEDURE — 99204 OFFICE O/P NEW MOD 45 MIN: CPT | Mod: S$PBB,25,, | Performed by: ORTHOPAEDIC SURGERY

## 2023-12-20 PROCEDURE — 99999PBSHW PR PBB SHADOW TECHNICAL ONLY FILED TO HB: Mod: PBBFAC,,,

## 2023-12-20 PROCEDURE — 20550 TENDON SHEATH: ICD-10-PCS | Mod: S$PBB,LT,, | Performed by: ORTHOPAEDIC SURGERY

## 2023-12-20 PROCEDURE — 73130 XR HAND COMPLETE 3 VIEW LEFT: ICD-10-PCS | Mod: 26,LT,, | Performed by: RADIOLOGY

## 2023-12-20 PROCEDURE — 99999 PR PBB SHADOW E&M-EST. PATIENT-LVL III: CPT | Mod: PBBFAC,,, | Performed by: ORTHOPAEDIC SURGERY

## 2023-12-20 PROCEDURE — 99213 OFFICE O/P EST LOW 20 MIN: CPT | Mod: PBBFAC,25 | Performed by: ORTHOPAEDIC SURGERY

## 2023-12-20 PROCEDURE — 20550 NJX 1 TENDON SHEATH/LIGAMENT: CPT | Mod: PBBFAC,LT | Performed by: ORTHOPAEDIC SURGERY

## 2023-12-20 PROCEDURE — 99999PBSHW PR PBB SHADOW TECHNICAL ONLY FILED TO HB: ICD-10-PCS | Mod: PBBFAC,,,

## 2023-12-20 PROCEDURE — 73130 X-RAY EXAM OF HAND: CPT | Mod: 26,LT,, | Performed by: RADIOLOGY

## 2023-12-20 RX ADMIN — METHYLPREDNISOLONE ACETATE 40 MG: 40 INJECTION, SUSPENSION INTRALESIONAL; INTRAMUSCULAR; INTRASYNOVIAL; SOFT TISSUE at 03:12

## 2023-12-20 NOTE — PROGRESS NOTES
"  [facility]  History & Physical  Orthopedics    SUBJECTIVE:      Chief Complaint:   Chief Complaint   Patient presents with    Left Hand - Pain, Numbness       Referring Provider: Katiuska Zamarripa PA-C     History of Present Illness:  Patient is a 63 y.o. right hand dominant male who presents today with complaints of left hand pain numbness, tingling and a left ring finger trigger finger.  Patient is a 62-year-old male with a history of ESRD on HD cardiomegaly, and renal cell with bilateral nephrectomy in the past.  Patient states that since July he has had pain in his left hand that is impacting his ability to use it.  He has never had any injuries to that hand otherwise.  The patient states that he was gardening when the next day when the injury happened.  The patient had denies any surgery in the arm though he does have a history of having multiple dialysis access he is in the past.  His current access is in his groin.       The patient denies any fevers, chills, N/V, D/C and presents for evaluation.       Past Medical History:   Diagnosis Date    Cancer     RCC s/p bilateral nephrectomy    Cardiomegaly     Dialysis patient     Encounter for blood transfusion     Glaucoma     H/O colonoscopy     Hyperlipemia     Hypertension     Renal disorder      Past Surgical History:   Procedure Laterality Date    DIALYSIS FISTULA CREATION      in arm and groin on R side     KIDNEY SURGERY      KIDNEY TRANSPLANT  kidney cancer    NEPHRECTOMY Bilateral Right 2009;  Left 2010    RCC both sides     Review of patient's allergies indicates:   Allergen Reactions    Minoxidil Itching, Swelling and Other (See Comments)     " drops blood pressure."      Social History     Social History Narrative    Not on file     Family History   Problem Relation Age of Onset    Heart disease Mother     Heart disease Father     Diabetes Father     Hypertension Father          Current Outpatient Medications:     acyclovir " (ZOVIRAX) 400 MG tablet, Take 1 tablet (400 mg total) by mouth 3 (three) times daily as needed (Take for 5 days for this episode)., Disp: 15 tablet, Rfl: 0    albuterol sulfate 90 mcg/actuation aebs, Inhale 180 mcg into the lungs every 4 (four) hours., Disp: , Rfl:     amLODIPine (NORVASC) 10 MG tablet, Take 1 tablet (10 mg total) by mouth once daily., Disp: 90 tablet, Rfl: 3    atorvastatin (LIPITOR) 40 MG tablet, Take 1 tablet (40 mg total) by mouth once daily., Disp: 90 tablet, Rfl: 3    B COMPLEX & C NO.20/FOLIC ACID (NEPHROCAPS ORAL), Take 1 capsule by mouth once daily., Disp: , Rfl:     cinacalcet (SENSIPAR) 60 MG Tab, Take 60 mg by mouth once daily. Take 60 mg daily at bedtime, Disp: , Rfl:     clopidogreL (PLAVIX) 75 mg tablet, Take 1 tablet (75 mg total) by mouth once daily., Disp: 90 tablet, Rfl: 3    doxazosin (CARDURA) 2 MG tablet, Take 1 tablet (2 mg total) by mouth every evening. (Patient not taking: Reported on 10/17/2023), Disp: 30 tablet, Rfl: 3    ERGOCALCIFEROL, VITAMIN D2, (VITAMIN D ORAL), Take 2,000 mg by mouth once daily., Disp: , Rfl:     ibuprofen (ADVIL,MOTRIN) 600 MG tablet, Take 1 tablet (600 mg total) by mouth every 6 (six) hours as needed for Pain., Disp: 20 tablet, Rfl: 0    latanoprost 0.005 % ophthalmic solution, 1 drop every evening., Disp: , Rfl:     losartan (COZAAR) 100 MG tablet, Take 1 tablet (100 mg total) by mouth once daily., Disp: 90 tablet, Rfl: 3    sevelamer carbonate (RENVELA) 800 mg Tab, Take 800 mg by mouth 3 (three) times daily with meals., Disp: , Rfl:     tadalafil (CIALIS) 5 MG tablet, Take 5 mg by mouth daily as needed for Erectile Dysfunction., Disp: , Rfl:     VIRT-CAPS 1 mg Cap, Take 1 capsule by mouth., Disp: , Rfl:     ROS    Review of Systems:  Constitutional: no fever or chills  Eyes: no visual changes  ENT: no nasal congestion or sore throat  Respiratory: no cough or shortness of breath  Cardiovascular: no chest pain  Gastrointestinal: no nausea or  vomiting, tolerating diet  Musculoskeletal: pain and soreness    OBJECTIVE:      Vital Signs (Most Recent):  There were no vitals filed for this visit.  There is no height or weight on file to calculate BMI.    Physical Exam    Physical Exam:  Constitutional: The patient appears well-developed and well-nourished. No distress.   Head: Normocephalic and atraumatic.   Nose: Nose normal.   Eyes: Conjunctivae and EOM are normal.   Neck: No tracheal deviation present.   Cardiovascular: Normal rate and intact distal pulses.    Pulmonary/Chest: Effort normal. No respiratory distress.   Abdominal: There is no guarding.   Lymphatic: Negative for adenopathy   Neurological: The patient is alert.   Psychiatric: The patient has a normal mood and affect.     Bilateral Hand/Wrist Examination:    Observation/Inspection:  Swelling  none    Deformity  none  Discoloration  none     Scars   none    Atrophy  none    HAND/WRIST EXAMINATION:  Finkelstein's Test   Neg  WHAT Test    Neg  Snuff box tenderness   Neg  Martinez's Test    Neg  Hook of Hamate Tenderness  Neg  CMC grind    Neg  Circumduction test   Neg  TFCC Compression Test  Neg    ROM hand/wrist/elbow full, good range of motion throughout the bilateral upper extremities, there is clicking locking in the ring finger on the left consistent with a trigger finger.  Patient also has tenderness to palpation throughout the MCP joints.  Patient has a palpable nodule at the level of the long finger on the left as well but denies any symptoms trigger finger on that side.    Neurovascular Exam:  Digits WWP, brisk CR < 3s throughout  NVI motor/LTS to M/R/U nerves, radial pulse 2+  2+ biceps and brachioradialis reflexes  Tinel's Test - Carpal Tunnel  + bilateral  Tinel's Test - Cubital Tunnel  Neg  Phalen's Test    + carpal tunnel bilaterally  Median Nerve Compression Test + bilateral    Diagnostic Results:    X-rays AP, lateral and oblique were reviewed    ASSESSMENT/PLAN:      63 y.o. yo male  with   Encounter Diagnoses   Name Primary?    Chronic pain of left hand     Bilateral carpal tunnel syndrome Yes    Trigger ring finger of left hand     Chronic kidney disease with end stage renal failure on dialysis       Plan:  We have discussed the natural history of trigger fingers and carpal tunnel including treatment options such as splinting, oral and topical anti-inflammatories, cortisone injections and surgery.    - -I have offered him a selective injection. I have explained the risks, benefits, and alternatives of the procedure in detail.  The patient voices understanding and all questions have been answered. The patient agrees to proceed as planned. So after a sterile prep of the skin in the normal fashion the left ring flexor sheath was injected using a 25 gauge needle with a combination of 1cc 1% plain lidocaine and 40 mg of methylprednisolone.  The patient is cautioned and immediate relief of pain is secondary to the local anesthetic and will be temporary.  After the anesthetic wears off there may be a increase in pain that may last for a few hours or a few days and they should use ice to help alleviate this flair up of pain. Patient tolerated the procedure well.    - ordered EMG of bilateral upper extremities to evaluate for carpal tunnel syndrome as he has positive provocative testing.     The patient will follow up after the EMG has been performed    The patient's pathophysiology was explained in detail with reference to x-rays, models, other visual aids as appropriate.  Treatment options were discussed in detail.  Questions were invited and answered to the patient's satisfaction. I reviewed Primary care , and other specialty's notes to better coordinate patient's care.          Saba Coates MD    Please be aware that this note has been generated with the assistance of MModal voice-to-text.  Please excuse any spelling or grammatical errors.

## 2023-12-25 RX ORDER — METHYLPREDNISOLONE ACETATE 40 MG/ML
40 INJECTION, SUSPENSION INTRA-ARTICULAR; INTRALESIONAL; INTRAMUSCULAR; SOFT TISSUE
Status: DISCONTINUED | OUTPATIENT
Start: 2023-12-20 | End: 2023-12-25 | Stop reason: HOSPADM

## 2023-12-26 NOTE — PROCEDURES
Tendon Sheath    Date/Time: 12/20/2023 3:30 PM    Performed by: Saba Coates MD  Authorized by: Saba Coates MD    Consent Done?:  Yes (Verbal)  Indications:  Pain  Timeout: prior to procedure the correct patient, procedure, and site was verified    Prep: patient was prepped and draped in usual sterile fashion      Local anesthesia used?: Yes    Anesthesia:  Local infiltration  Local anesthetic:  Lidocaine 1% without epinephrine  Anesthetic total (ml):  1    Location:  Ring finger  Site:  L ring flexor tendon sheath  Ultrasonic guidance for needle placement?: No    Needle size:  25 G  Approach:  Volar  Medications:  40 mg methylPREDNISolone acetate 40 mg/mL  Patient tolerance:  Patient tolerated the procedure well with no immediate complications     normal mood with appropriate affect

## 2024-02-20 ENCOUNTER — PROCEDURE VISIT (OUTPATIENT)
Dept: NEUROLOGY | Facility: CLINIC | Age: 64
End: 2024-02-20
Payer: MEDICARE

## 2024-02-20 DIAGNOSIS — G56.03 BILATERAL CARPAL TUNNEL SYNDROME: ICD-10-CM

## 2024-02-20 PROCEDURE — 95911 NRV CNDJ TEST 9-10 STUDIES: CPT | Mod: 26,S$PBB,, | Performed by: PHYSICAL MEDICINE & REHABILITATION

## 2024-02-20 PROCEDURE — 95885 MUSC TST DONE W/NERV TST LIM: CPT | Mod: 26,59,S$PBB, | Performed by: PHYSICAL MEDICINE & REHABILITATION

## 2024-02-20 PROCEDURE — 95886 MUSC TEST DONE W/N TEST COMP: CPT | Mod: PBBFAC | Performed by: PHYSICAL MEDICINE & REHABILITATION

## 2024-02-20 PROCEDURE — 95911 NRV CNDJ TEST 9-10 STUDIES: CPT | Mod: PBBFAC | Performed by: PHYSICAL MEDICINE & REHABILITATION

## 2024-02-20 PROCEDURE — 95886 MUSC TEST DONE W/N TEST COMP: CPT | Mod: 26,S$PBB,, | Performed by: PHYSICAL MEDICINE & REHABILITATION

## 2024-02-20 PROCEDURE — 95885 MUSC TST DONE W/NERV TST LIM: CPT | Mod: 59,PBBFAC | Performed by: PHYSICAL MEDICINE & REHABILITATION

## 2024-02-21 ENCOUNTER — OFFICE VISIT (OUTPATIENT)
Dept: ORTHOPEDICS | Facility: CLINIC | Age: 64
End: 2024-02-21
Payer: MEDICARE

## 2024-02-21 VITALS — HEIGHT: 71 IN | WEIGHT: 200 LBS | BODY MASS INDEX: 28 KG/M2

## 2024-02-21 DIAGNOSIS — G89.29 CHRONIC PAIN OF LEFT HAND: ICD-10-CM

## 2024-02-21 DIAGNOSIS — G56.03 BILATERAL CARPAL TUNNEL SYNDROME: Primary | ICD-10-CM

## 2024-02-21 DIAGNOSIS — M79.642 CHRONIC PAIN OF LEFT HAND: ICD-10-CM

## 2024-02-21 PROCEDURE — 99213 OFFICE O/P EST LOW 20 MIN: CPT | Mod: PBBFAC | Performed by: ORTHOPAEDIC SURGERY

## 2024-02-21 PROCEDURE — 99214 OFFICE O/P EST MOD 30 MIN: CPT | Mod: S$PBB,,, | Performed by: ORTHOPAEDIC SURGERY

## 2024-02-21 PROCEDURE — 99999 PR PBB SHADOW E&M-EST. PATIENT-LVL III: CPT | Mod: PBBFAC,,, | Performed by: ORTHOPAEDIC SURGERY

## 2024-02-28 ENCOUNTER — OFFICE VISIT (OUTPATIENT)
Dept: ORTHOPEDICS | Facility: CLINIC | Age: 64
End: 2024-02-28
Payer: MEDICAID

## 2024-02-28 DIAGNOSIS — G56.03 BILATERAL CARPAL TUNNEL SYNDROME: Primary | ICD-10-CM

## 2024-02-28 PROCEDURE — 99499 UNLISTED E&M SERVICE: CPT | Mod: 95,,, | Performed by: ORTHOPAEDIC SURGERY

## 2024-03-19 NOTE — PROGRESS NOTES
"Glenn Collier presents for follow up evaluation of   Encounter Diagnoses   Name Primary?    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 Carpal tunnel syndrome).  There is motor axonal loss.  There is active denervation.  This is graded as Severe in severity bilaterally, with the left being worse than the right comparatively.      There is evidence to suggest a chronic left C6 and/or C7 radiculopathy, with no active/ongoing denervation.      He continues to have pain, numbness and tingling as well as difficulty with daily activities.       Vitals:    02/21/24 1535   Weight: 90.7 kg (200 lb)   Height: 5' 11" (1.803 m)   PainSc:   7   PainLoc: Hand     History of Present Illness      PE:    AA&O x 4.  NAD  HEENT:  NCAT, sclera nonicteric  Lungs:  Respirations are equal and unlabored.  CV:  2+ bilateral upper and lower extremity pulses.  MSK:  + compression, tinel's and Phalen's bilateral wrists. Neurovascularly intact bilaterally.  5/5 thenar and intrinsic musculature strength.  Full range of motion hands, wrists and elbows.      ASSESSMENT: Bilateral severe Carpal tunnel syndrome  Assessment & Plan      PLAN:   We have discussed conservative vs. surgical treatment as well as risks, benefits and alternatives for bilateral carpal tunnel syndrome.  He has exhausted conservative measures and would like to proceed with surgery. Surgery would include left carpal tunnel release.  Light use of the hand will be indicated for the first 4-6 weeks. He would like to speak to his wife about the procedure.    We have discussed risks of hand surgery which include but are not limited to blood clots in the legs that can travel to the lungs (pulmonary embolism). Pulmonary embolism can cause shortness of breath, chest " pain, and even shock. Other risks include urinary tract infection, nausea and vomiting (usually related to pain medication), chronic pain, bleeding, nerve damage, blood vessel injury, scarring and infection of the hand which can require re-operation. Furthermore, the risks of anesthesia include potential heart, lung, kidney, and liver damage.  Informed consent was obtained.  he understands and would like to proceed with surgery in the near future.    Call with any questions/concerns in the interim        Saba Coates MD    Please be aware that this note has been generated with the assistance of [employer] voice-to-text.  Please excuse any spelling or grammatical errors.  This note was generated with the assistance of ambient listening technology. Verbal consent was obtained by the patient and accompanying visitor(s) for the recording of patient appointment to facilitate this note. I attest to having reviewed and edited the generated note for accuracy, though some syntax or spelling errors may persist. Please contact the author of this note for any clarification.

## 2024-03-28 ENCOUNTER — TELEPHONE (OUTPATIENT)
Dept: PREADMISSION TESTING | Facility: HOSPITAL | Age: 64
End: 2024-03-28

## 2024-03-28 NOTE — TELEPHONE ENCOUNTER
----- Message from Gunnar Camilo sent at 3/19/2024  1:08 PM CDT -----  Regarding: PCP Clearance for Surgical Patient - Dr. Coates  Hi,     Would you please reach out and schedule PCP clearance appointment with Dr. Kulkarni or either his NPs (Shana Chowdhury & Fernando Rizvi) for Dr. Coates surgical patient dos TBD pending clearance. Per Dr. Coates    Thank you so much,    Gunnar Camilo, MS, OTC   Sports Medicine Assistant   [facility]

## 2024-04-17 DIAGNOSIS — E78.00 HYPERCHOLESTEROLEMIA: ICD-10-CM

## 2024-04-17 RX ORDER — ATORVASTATIN CALCIUM 40 MG/1
40 TABLET, FILM COATED ORAL
Qty: 90 TABLET | Refills: 3 | Status: SHIPPED | OUTPATIENT
Start: 2024-04-17 | End: 2024-04-18 | Stop reason: SDUPTHER

## 2024-04-18 ENCOUNTER — OFFICE VISIT (OUTPATIENT)
Dept: CARDIOLOGY | Facility: CLINIC | Age: 64
End: 2024-04-18
Attending: INTERNAL MEDICINE
Payer: MEDICARE

## 2024-04-18 VITALS
DIASTOLIC BLOOD PRESSURE: 64 MMHG | HEIGHT: 71 IN | SYSTOLIC BLOOD PRESSURE: 112 MMHG | WEIGHT: 194.75 LBS | HEART RATE: 65 BPM | OXYGEN SATURATION: 99 % | BODY MASS INDEX: 27.27 KG/M2

## 2024-04-18 DIAGNOSIS — Z99.2 ANEMIA IN CHRONIC KIDNEY DISEASE, ON CHRONIC DIALYSIS: ICD-10-CM

## 2024-04-18 DIAGNOSIS — I15.0 RENOVASCULAR HYPERTENSION: ICD-10-CM

## 2024-04-18 DIAGNOSIS — Z99.2 CHRONIC KIDNEY DISEASE WITH END STAGE RENAL FAILURE ON DIALYSIS: ICD-10-CM

## 2024-04-18 DIAGNOSIS — Z86.73 HISTORY OF CEREBROVASCULAR ACCIDENT: ICD-10-CM

## 2024-04-18 DIAGNOSIS — N18.6 CHRONIC KIDNEY DISEASE WITH END STAGE RENAL FAILURE ON DIALYSIS: ICD-10-CM

## 2024-04-18 DIAGNOSIS — N18.6 ANEMIA IN CHRONIC KIDNEY DISEASE, ON CHRONIC DIALYSIS: ICD-10-CM

## 2024-04-18 DIAGNOSIS — D63.1 ANEMIA IN CHRONIC KIDNEY DISEASE, ON CHRONIC DIALYSIS: ICD-10-CM

## 2024-04-18 DIAGNOSIS — E66.3 OVERWEIGHT: ICD-10-CM

## 2024-04-18 DIAGNOSIS — E78.00 HYPERCHOLESTEROLEMIA: ICD-10-CM

## 2024-04-18 PROCEDURE — 93005 ELECTROCARDIOGRAM TRACING: CPT

## 2024-04-18 PROCEDURE — 93005 ELECTROCARDIOGRAM TRACING: CPT | Mod: PBBFAC | Performed by: INTERNAL MEDICINE

## 2024-04-18 PROCEDURE — 93010 ELECTROCARDIOGRAM REPORT: CPT | Mod: S$PBB,,, | Performed by: INTERNAL MEDICINE

## 2024-04-18 PROCEDURE — 99999 PR PBB SHADOW E&M-EST. PATIENT-LVL III: CPT | Mod: PBBFAC,,, | Performed by: INTERNAL MEDICINE

## 2024-04-18 PROCEDURE — 99213 OFFICE O/P EST LOW 20 MIN: CPT | Mod: PBBFAC,25 | Performed by: INTERNAL MEDICINE

## 2024-04-18 PROCEDURE — 99214 OFFICE O/P EST MOD 30 MIN: CPT | Mod: 25,S$PBB,, | Performed by: INTERNAL MEDICINE

## 2024-04-18 RX ORDER — AMLODIPINE BESYLATE 10 MG/1
10 TABLET ORAL DAILY
Qty: 90 TABLET | Refills: 3 | Status: SHIPPED | OUTPATIENT
Start: 2024-04-18

## 2024-04-18 RX ORDER — LOSARTAN POTASSIUM 100 MG/1
100 TABLET ORAL DAILY
Qty: 90 TABLET | Refills: 3 | Status: SHIPPED | OUTPATIENT
Start: 2024-04-18

## 2024-04-18 RX ORDER — ATORVASTATIN CALCIUM 40 MG/1
40 TABLET, FILM COATED ORAL DAILY
Qty: 90 TABLET | Refills: 3 | Status: SHIPPED | OUTPATIENT
Start: 2024-04-18

## 2024-04-18 RX ORDER — CLOPIDOGREL BISULFATE 75 MG/1
75 TABLET ORAL DAILY
Qty: 90 TABLET | Refills: 3 | Status: SHIPPED | OUTPATIENT
Start: 2024-04-18

## 2024-04-18 NOTE — PROGRESS NOTES
Please see notes from same date of service 05/09/2017 regarding metabolic syndrome.    LDL 47 on 11/2016.    Lab Results   Component Value Date/Time    CHOLESTEROL, 11/01/2016 10:15 AM    LDL 90 06/06/2013 09:00 AM    HDL 54 11/01/2016 10:15 AM    TRIGLYCERIDES 85 11/01/2016 10:15 AM       Subjective:     Glenn Collier is a 63 y.o. male with hypertension and hypercholesterolemia. He is overweight. He developed end stage renal disease due to hypertension in 1997 and then began hemodialysis. Access is a graft in the right thigh. He has undergone bilateral nephrectomies. He had a large pericardial effusion in 2006 that resolved with intensified dialysis. He receives erythropoietin shots about every two weeks. There has been no issues with his access in the right thigh. He says he once had a very light stroke. He denies any exertional chest pain or exertional dyspnea. He denies any claudication. No palpitations or weak spells. No bleeding. No issues with any of his prescribed medications. Feeling well overall.      Hypertension  This is a chronic problem. The current episode started more than 1 year ago. The problem is unchanged. The problem is controlled (usually 120-130/60-80 mmHg at home). Pertinent negatives include no anxiety, blurred vision, chest pain, headaches, malaise/fatigue, neck pain, orthopnea, palpitations, peripheral edema, PND, shortness of breath or sweats. Identifiable causes of hypertension include chronic renal disease.   Hyperlipidemia  This is a chronic problem. The current episode started more than 1 year ago. The problem is controlled. Recent lipid tests were reviewed and are normal. Exacerbating diseases include chronic renal disease. He has no history of diabetes, hypothyroidism, liver disease, obesity or nephrotic syndrome. Pertinent negatives include no chest pain, focal sensory loss, focal weakness, leg pain, myalgias or shortness of breath.   Cerebrovascular Accident  Pertinent negatives include no chest pain, chills, coughing, fever, headaches, myalgias, nausea, neck pain, numbness, rash, vertigo, vomiting or weakness.       Review of Systems   Constitutional: Negative for chills, fever and malaise/fatigue.   HENT:  Negative for nosebleeds.    Eyes:  Negative for blurred  vision, double vision, vision loss in left eye and vision loss in right eye.   Cardiovascular:  Negative for chest pain, claudication, dyspnea on exertion, irregular heartbeat, leg swelling, near-syncope, orthopnea, palpitations, paroxysmal nocturnal dyspnea and syncope.   Respiratory:  Negative for cough, hemoptysis, shortness of breath and wheezing.    Endocrine: Negative for cold intolerance and heat intolerance.   Hematologic/Lymphatic: Negative for bleeding problem. Does not bruise/bleed easily.   Skin:  Negative for color change and rash.   Musculoskeletal:  Negative for back pain, falls, muscle weakness, myalgias and neck pain.   Gastrointestinal:  Negative for heartburn, hematemesis, hematochezia, hemorrhoids, jaundice, melena, nausea and vomiting.   Neurological:  Negative for dizziness, focal weakness, headaches, light-headedness, loss of balance, numbness, tremors, vertigo and weakness.   Psychiatric/Behavioral:  Negative for altered mental status, depression and memory loss. The patient is not nervous/anxious.    Allergic/Immunologic: Negative for hives and persistent infections.       Current Outpatient Medications on File Prior to Visit   Medication Sig Dispense Refill    albuterol sulfate 90 mcg/actuation aebs Inhale 180 mcg into the lungs every 4 (four) hours.      amLODIPine (NORVASC) 10 MG tablet Take 1 tablet (10 mg total) by mouth once daily. 90 tablet 3    atorvastatin (LIPITOR) 40 MG tablet TAKE 1 TABLET(40 MG) BY MOUTH EVERY DAY 90 tablet 3    B COMPLEX & C NO.20/FOLIC ACID (NEPHROCAPS ORAL) Take 1 capsule by mouth once daily.      cinacalcet (SENSIPAR) 60 MG Tab Take 60 mg by mouth once daily. Take 60 mg daily at bedtime      clopidogreL (PLAVIX) 75 mg tablet Take 1 tablet (75 mg total) by mouth once daily. 90 tablet 3    latanoprost 0.005 % ophthalmic solution 1 drop every evening.      losartan (COZAAR) 100 MG tablet Take 1 tablet (100 mg total) by mouth once daily. 90 tablet 3    sevelamer  "carbonate (RENVELA) 800 mg Tab Take 800 mg by mouth 3 (three) times daily with meals.      tadalafil (CIALIS) 5 MG tablet Take 5 mg by mouth daily as needed for Erectile Dysfunction.      VIRT-CAPS 1 mg Cap Take 1 capsule by mouth.      vitamin D (VITAMIN D3) 1000 units Tab Take 1,000 Units by mouth once daily. Take 1 capsule daily.      acyclovir (ZOVIRAX) 400 MG tablet Take 1 tablet (400 mg total) by mouth 3 (three) times daily as needed (Take for 5 days for this episode). 15 tablet 0    doxazosin (CARDURA) 2 MG tablet Take 1 tablet (2 mg total) by mouth every evening. (Patient not taking: Reported on 10/17/2023) 30 tablet 3    ibuprofen (ADVIL,MOTRIN) 600 MG tablet Take 1 tablet (600 mg total) by mouth every 6 (six) hours as needed for Pain. (Patient not taking: Reported on 4/18/2024) 20 tablet 0     Current Facility-Administered Medications on File Prior to Visit   Medication Dose Route Frequency Provider Last Rate Last Admin    heparin (porcine) injection  500 Units/hr Intravenous Continuous Bodana, Shirisha, DO 0.5 mL/hr at 03/15/24 0602 500 Units/hr at 03/15/24 0602    [DISCONTINUED] heparin (porcine) injection  500 Units/hr Intravenous Continuous Bodana, Shirisha, DO 0.5 mL/hr at 04/17/24 0559 500 Units/hr at 04/17/24 0559       /64   Pulse 65   Ht 5' 11" (1.803 m)   Wt 88.3 kg (194 lb 12.4 oz)   SpO2 99%   BMI 27.17 kg/m²      Objective:     Physical Exam  Constitutional:       General: He is not in acute distress.     Appearance: Normal appearance. He is well-developed. He is not ill-appearing, toxic-appearing or diaphoretic.   HENT:      Head: Normocephalic and atraumatic.      Nose: Nose normal.   Eyes:      General:         Right eye: No discharge.         Left eye: No discharge.      Conjunctiva/sclera:      Right eye: Right conjunctiva is not injected.      Left eye: Left conjunctiva is not injected.      Pupils: Pupils are equal.      Right eye: Pupil is round.      Left eye: Pupil is " round.   Neck:      Thyroid: No thyromegaly.      Vascular: No carotid bruit or JVD.   Cardiovascular:      Rate and Rhythm: Normal rate and regular rhythm. No extrasystoles are present.     Chest Wall: PMI is not displaced.      Pulses:           Radial pulses are 2+ on the right side and 2+ on the left side.        Femoral pulses are 2+ on the right side and 2+ on the left side.       Dorsalis pedis pulses are 1+ on the right side and 1+ on the left side.        Posterior tibial pulses are 1+ on the right side and 1+ on the left side.      Heart sounds: S1 normal and S2 normal. Murmur heard.      Harsh midsystolic murmur is present at the upper right sternal border. Graft right upper thigh.  Multiple fistulas and scars right arm.  No edema of arms.      Gallop present. S4 sounds present.   Pulmonary:      Effort: Pulmonary effort is normal.      Breath sounds: Normal breath sounds.   Abdominal:      Palpations: Abdomen is soft.      Tenderness: There is no abdominal tenderness.   Musculoskeletal:      Cervical back: Neck supple.      Right ankle: No swelling, deformity or ecchymosis.      Left ankle: No swelling, deformity or ecchymosis.   Lymphadenopathy:      Head:      Right side of head: No submandibular adenopathy.      Left side of head: No submandibular adenopathy.      Cervical: No cervical adenopathy.   Skin:     General: Skin is warm and dry.      Findings: No rash.      Nails: There is no clubbing.   Neurological:      General: No focal deficit present.      Mental Status: He is alert and oriented to person, place, and time. He is not disoriented.      Cranial Nerves: No cranial nerve deficit.      Sensory: No sensory deficit.   Psychiatric:         Attention and Perception: Attention and perception normal.         Mood and Affect: Mood and affect normal.         Speech: Speech normal.         Behavior: Behavior normal.         Thought Content: Thought content normal.         Cognition and Memory:  Cognition and memory normal.         Judgment: Judgment normal.       Assessment:     1. History of cerebrovascular accident    2. Renovascular hypertension    3. Hypercholesterolemia    4. Overweight    5. Chronic kidney disease with end stage renal failure on dialysis    6. Anemia in chronic kidney disease, on chronic dialysis        Plan:     History of Cerebrovascular Accident   Tells me once was told he had a light stroke. No sequela.   4/19/2023: Aspirin 81 mg Q24 was discontinued.   On clopidogrel 75 mg Q24.   No bleeding.     2. Hypertension   1997: Diagnosed with severe hypertension.   On amlodipine 10 mg Q24 and losartan 100 mg Q24.   Keeping log at home.   Appears well controlled.    3. Hypercholesterolemia   1985: Began statin.   On atorvastatin 20 mg Q24.   6/17/2016: Chol 109. HDL 31. LDL 43. .   2/20/2023: Chol 122. HDL 49. LDL 46. .   On atorvastatin 40 mg Q24.   Very favorable lipid panel.    4. Overweight   4/19/2021: Weight 93 kg. BMI 29.   Encouraged to lose weight.    5. End Stage Kidney Disease on Dialysis   9/18/1997: Began dialysis. [facility]   Dr. Steve Garg.     6. Anemia   Due to end stage renal disease and nephrectomies.   Receiving erythropoietin injections about every two weeks.    7. Primary Care   Dr. Steve Garg.    F/u 6 months.    Manuel Vides M.D.

## 2024-04-22 LAB
OHS QRS DURATION: 124 MS
OHS QTC CALCULATION: 446 MS

## 2024-04-25 ENCOUNTER — TELEPHONE (OUTPATIENT)
Dept: PREADMISSION TESTING | Facility: HOSPITAL | Age: 64
End: 2024-04-25
Payer: MEDICARE

## 2024-04-30 ENCOUNTER — TELEPHONE (OUTPATIENT)
Dept: ORTHOPEDICS | Facility: CLINIC | Age: 64
End: 2024-04-30
Payer: MEDICARE

## 2024-06-25 ENCOUNTER — OFFICE VISIT (OUTPATIENT)
Dept: SURGERY | Facility: CLINIC | Age: 64
End: 2024-06-25
Attending: SPECIALIST
Payer: MEDICARE

## 2024-06-25 VITALS
BODY MASS INDEX: 27.58 KG/M2 | OXYGEN SATURATION: 100 % | DIASTOLIC BLOOD PRESSURE: 82 MMHG | HEART RATE: 73 BPM | SYSTOLIC BLOOD PRESSURE: 141 MMHG | HEIGHT: 71 IN | WEIGHT: 197 LBS

## 2024-06-25 DIAGNOSIS — T82.599A MECHANICAL COMPLICATION OF VASCULAR DEVICE, INITIAL ENCOUNTER: Primary | ICD-10-CM

## 2024-06-25 PROCEDURE — 99215 OFFICE O/P EST HI 40 MIN: CPT | Mod: S$PBB,,, | Performed by: SPECIALIST

## 2024-06-25 PROCEDURE — 99999 PR PBB SHADOW E&M-EST. PATIENT-LVL IV: CPT | Mod: PBBFAC,,, | Performed by: SPECIALIST

## 2024-06-25 PROCEDURE — 99214 OFFICE O/P EST MOD 30 MIN: CPT | Mod: PBBFAC | Performed by: SPECIALIST

## 2024-06-25 NOTE — PROGRESS NOTES
"History & Physical    Subjective     History of Present Illness:  Patient is a 63 y.o. male presents with longstanding AV graft right leg for revision.  Patient with pseudoaneurysm along medial aspect of graft.  No skin ulceration or breakdown.  Positive thrill.  History of CVA with recovery.  Hypertension, hyperlipidemia.    Chief Complaint   Patient presents with    Other     access       Review of patient's allergies indicates:   Allergen Reactions    Minoxidil Itching, Swelling and Other (See Comments)     " drops blood pressure."        Current Outpatient Medications   Medication Sig Dispense Refill    acyclovir (ZOVIRAX) 400 MG tablet Take 1 tablet (400 mg total) by mouth 3 (three) times daily as needed (Take for 5 days for this episode). 15 tablet 0    albuterol sulfate 90 mcg/actuation aebs Inhale 180 mcg into the lungs every 4 (four) hours.      amLODIPine (NORVASC) 10 MG tablet Take 1 tablet (10 mg total) by mouth once daily. 90 tablet 3    atorvastatin (LIPITOR) 40 MG tablet Take 1 tablet (40 mg total) by mouth once daily. 90 tablet 3    B COMPLEX & C NO.20/FOLIC ACID (NEPHROCAPS ORAL) Take 1 capsule by mouth once daily.      cinacalcet (SENSIPAR) 60 MG Tab Take 60 mg by mouth once daily. Take 60 mg daily at bedtime      clopidogreL (PLAVIX) 75 mg tablet Take 1 tablet (75 mg total) by mouth once daily. 90 tablet 3    ibuprofen (ADVIL,MOTRIN) 600 MG tablet Take 1 tablet (600 mg total) by mouth every 6 (six) hours as needed for Pain. (Patient not taking: Reported on 4/18/2024) 20 tablet 0    latanoprost 0.005 % ophthalmic solution 1 drop every evening.      losartan (COZAAR) 100 MG tablet Take 1 tablet (100 mg total) by mouth once daily. 90 tablet 3    sevelamer carbonate (RENVELA) 800 mg Tab Take 800 mg by mouth 3 (three) times daily with meals.      tadalafil (CIALIS) 5 MG tablet Take 5 mg by mouth daily as needed for Erectile Dysfunction.      VIRT-CAPS 1 mg Cap Take 1 capsule by mouth.      vitamin D " "(VITAMIN D3) 1000 units Tab Take 1,000 Units by mouth once daily. Take 1 capsule daily.       No current facility-administered medications for this visit.     Facility-Administered Medications Ordered in Other Visits   Medication Dose Route Frequency Provider Last Rate Last Admin    heparin (porcine) injection  500 Units/hr Intravenous Continuous Forest Steve, DO 0.5 mL/hr at 03/15/24 0602 500 Units/hr at 03/15/24 0602       Past Medical History:   Diagnosis Date    Cancer     RCC s/p bilateral nephrectomy    Cardiomegaly     Dialysis patient     Encounter for blood transfusion     Glaucoma     H/O colonoscopy     Hyperlipemia     Hypertension     Renal disorder      Past Surgical History:   Procedure Laterality Date    DIALYSIS FISTULA CREATION      in arm and groin on R side     KIDNEY SURGERY      KIDNEY TRANSPLANT  kidney cancer    NEPHRECTOMY Bilateral Right 2009;  Left 2010    RCC both sides     Family History   Problem Relation Name Age of Onset    Heart disease Mother      Heart disease Father      Diabetes Father      Hypertension Father       Social History     Tobacco Use    Smoking status: Never    Smokeless tobacco: Never   Substance Use Topics    Alcohol use: No    Drug use: No        Review of Systems:  Review of Systems   Constitutional: Negative.  Negative for fatigue and fever.   HENT: Negative.  Negative for nosebleeds, sore throat and trouble swallowing.    Eyes: Negative.    Respiratory: Negative.     Cardiovascular: Negative.  Negative for chest pain.   Gastrointestinal: Negative.    Genitourinary: Negative.    Musculoskeletal: Negative.    Skin: Negative.    Neurological: Negative.    Psychiatric/Behavioral: Negative.            Objective     Vital Signs (Most Recent)  Pulse: 73 (06/25/24 1243)  BP: (!) 141/82 (06/25/24 1243)  SpO2: 100 % (06/25/24 1243)  5' 11" (1.803 m)  89.4 kg (197 lb)     Physical Exam:  Physical Exam  Constitutional:       General: He is not in acute distress.     " Appearance: He is well-developed and normal weight. He is not ill-appearing.   HENT:      Head: Normocephalic and atraumatic.      Right Ear: External ear normal.      Left Ear: External ear normal.   Eyes:      General: No scleral icterus.     Extraocular Movements: Extraocular movements intact.      Pupils: Pupils are equal, round, and reactive to light.   Cardiovascular:      Rate and Rhythm: Normal rate and regular rhythm.      Heart sounds: Normal heart sounds. No murmur heard.     No friction rub. No gallop.   Pulmonary:      Effort: No respiratory distress.      Breath sounds: Normal breath sounds. No stridor. No wheezing, rhonchi or rales.   Abdominal:      General: Bowel sounds are normal.      Palpations: Abdomen is soft.   Musculoskeletal:         General: Normal range of motion.      Cervical back: Neck supple.      Comments: Arteriovenous fistula right thigh.  Pseudoaneurysmal dilatation medial limb of graft   Skin:     General: Skin is warm and dry.      Coloration: Skin is not jaundiced.      Findings: No bruising or rash.   Neurological:      General: No focal deficit present.      Mental Status: He is alert and oriented to person, place, and time.      Motor: No weakness.      Coordination: Coordination normal.   Psychiatric:         Mood and Affect: Mood normal.         Behavior: Behavior normal.         Thought Content: Thought content normal.         Judgment: Judgment normal.          Assessment and Plan   Pseudoaneurysms of AV graft right thigh for revision.  Consents reviewed patient and signed

## 2024-07-08 ENCOUNTER — ANESTHESIA EVENT (OUTPATIENT)
Dept: SURGERY | Facility: OTHER | Age: 64
End: 2024-07-08
Payer: MEDICARE

## 2024-07-08 RX ORDER — PREGABALIN 75 MG/1
75 CAPSULE ORAL ONCE
Status: CANCELLED | OUTPATIENT
Start: 2024-07-08 | End: 2024-07-08

## 2024-07-08 RX ORDER — SODIUM CHLORIDE, SODIUM LACTATE, POTASSIUM CHLORIDE, CALCIUM CHLORIDE 600; 310; 30; 20 MG/100ML; MG/100ML; MG/100ML; MG/100ML
INJECTION, SOLUTION INTRAVENOUS CONTINUOUS
Status: CANCELLED | OUTPATIENT
Start: 2024-07-08

## 2024-07-08 RX ORDER — ACETAMINOPHEN 500 MG
1000 TABLET ORAL
Status: CANCELLED | OUTPATIENT
Start: 2024-07-08 | End: 2024-07-08

## 2024-07-08 RX ORDER — LIDOCAINE HYDROCHLORIDE 10 MG/ML
0.5 INJECTION, SOLUTION EPIDURAL; INFILTRATION; INTRACAUDAL; PERINEURAL ONCE
Status: CANCELLED | OUTPATIENT
Start: 2024-07-08 | End: 2024-07-08

## 2024-07-09 ENCOUNTER — HOSPITAL ENCOUNTER (OUTPATIENT)
Dept: PREADMISSION TESTING | Facility: OTHER | Age: 64
Discharge: HOME | End: 2024-07-09
Attending: SPECIALIST
Payer: MEDICARE

## 2024-07-09 VITALS
SYSTOLIC BLOOD PRESSURE: 146 MMHG | RESPIRATION RATE: 18 BRPM | BODY MASS INDEX: 27.58 KG/M2 | HEART RATE: 61 BPM | DIASTOLIC BLOOD PRESSURE: 69 MMHG | HEIGHT: 71 IN | TEMPERATURE: 97 F | OXYGEN SATURATION: 100 % | WEIGHT: 197 LBS

## 2024-07-09 NOTE — ANESTHESIA PREPROCEDURE EVALUATION
07/09/2024  Glenn Collier is a 63 y.o., male.      Pre-op Assessment    I have reviewed the Patient Summary Reports.     I have reviewed the Nursing Notes. I have reviewed the NPO Status.   I have reviewed the Medications.     Review of Systems  Anesthesia Hx:             Denies Family Hx of Anesthesia complications.    Denies Personal Hx of Anesthesia complications.                    Social:  Non-Smoker       Hematology/Oncology:       -- Anemia:                    --  Cancer in past history (Renal Cell Carc):                     Cardiovascular:     Hypertension           hyperlipidemia                             Pulmonary:  Pulmonary Normal                       Renal/:  Chronic Renal Disease, ESRD, Dialysis Renal calculi: M, W, F.               Hepatic/GI:  Hepatic/GI Normal                 Neurological:   CVA                                    Endocrine:  Endocrine Normal                Physical Exam  General: Well nourished, Cooperative, Alert and Oriented    Airway:  Mallampati: II   Mouth Opening: Normal  TM Distance: Normal  Tongue: Normal  Neck ROM: Normal ROM    Dental:  Intact, Dentures      Anesthesia Plan  Type of Anesthesia, risks & benefits discussed:    Anesthesia Type: Gen ETT  Intra-op Monitoring Plan: Standard ASA Monitors  Post Op Pain Control Plan: multimodal analgesia  Induction:  IV  Airway Plan: Video, Post-Induction  Informed Consent: Informed consent signed with the Patient and all parties understand the risks and agree with anesthesia plan.  All questions answered.   ASA Score: 3  Anesthesia Plan Notes: DOS labs    Ready For Surgery From Anesthesia Perspective.     .

## 2024-07-09 NOTE — DISCHARGE INSTRUCTIONS
Information to Prepare you for your Surgery    PRE-ADMIT TESTING -  [phone]    [address]  [facility]          Your surgery has been scheduled at [facility]. We are pleased to have the opportunity to serve you. For Further Information please call [phone].    On the day of surgery please report to the Information Desk on the 1st floor.    CONTACT YOUR PHYSICIAN'S OFFICE THE DAY PRIOR TO YOUR SURGERY TO OBTAIN YOUR ARRIVAL TIME.     The evening before surgery do not eat anything after 9 p.m. ( this includes hard candy, chewing gum and mints).  You may only have GATORADE, POWERADE AND WATER  from 9 p.m. until you leave your home.   DO NOT DRINK ANY LIQUIDS ON THE WAY TO THE HOSPITAL.      Why does your anesthesiologist allow you to drink Gatorade/Powerade before surgery?  Gatorade/Powerade helps to increase your comfort before surgery and to decrease your nausea after surgery. The carbohydrates in Gatorade/Powerade help reduce your body's stress response to surgery.  If you are a diabetic-drink only water prior to surgery.    Outpatient Surgery- May allow 2 adult (18 and older) Support Persons (1 being the designated ) for all surgical/procedural patients. A breastfeeding mother will be allowed her infant and 2 adult Support Persons. No one under the age of 18 will be allowed in the building. No swapping out of visitors in the [facility].      SPECIAL MEDICATION INSTRUCTIONS: TAKE medications checked off by the Anesthesiologist on your Medication List.    Angiogram Patients: Take medications as instructed by your physician, including aspirin.     Surgery Patients:    If you take ASPIRIN - Your PHYSICIAN/SURGEON will need to inform you IF/OR when you need to stop taking aspirin prior to your surgery.     The week prior to surgery do not ot take any medications containing IBUPROFEN or NSAIDS ( Advil, Motrin, Goodys, BC, Aleve, Naproxen etc)  If you are not sure if you should take a medicine please call your surgeon's office.  Ok to take Tylenol    Do Not Wear any make-up (especially eye make-up) to surgery. Please remove any false eyelashes or eyelash extensions. If you arrive the day of surgery with makeup/eyelashes on you will be required to remove prior to surgery. (There is a risk of corneal abrasions if eye makeup/eyelash extensions are not removed)      Leave all valuables at home.   Do Not wear any jewelry or watches, including any metal in body piercings. Jewelry must be removed prior to coming to the hospital.  There is a possibility that rings that are unable to be removed may be cut off if they are on the surgical extremity.    Please remove all hair extensions, wigs, clips and any other metal accessories/ ornaments from your hair.  These items may pose a flammable/fire risk in Surgery and must be removed.    Do not shave your surgical area at least 5 days prior to your surgery. The surgical prep will be performed at the hospital according to Infection Control regulations.    Contact Lens must be removed before surgery. Either do not wear the contact lens or bring a case and solution for storage.  Please bring a container for eyeglasses or dentures as required.  Bring any paperwork your physician has provided, such as consent forms,  history and physicals, doctor's orders, etc.   Bring comfortable clothes that are loose fitting to wear upon discharge. Take into consideration the type of surgery being performed.  Maintain your diet as advised per your physician the day prior to surgery.      Adequate rest the night before surgery is advised.   Park in the Parking lot behind the hospital or in the [facility] across the street from the parking lot. Parking is complimentary.  If you will be discharged the same day as your procedure, please arrange for a responsible adult to drive you home or to accompany you if traveling by taxi.    YOU WILL NOT BE PERMITTED TO DRIVE OR TO LEAVE THE HOSPITAL ALONE AFTER SURGERY.   If you are being discharged the same day, it is strongly recommended that you arrange for someone to remain with you for the first 24 hrs following your surgery.    The Surgeon will speak to your family/visitor after your surgery regarding the outcome of your surgery and post op care.  The Surgeon may speak to you after your surgery, but there is a possibility you may not remember the details.  Please check with your family members regarding the conversation with the Surgeon.    We strongly recommend whoever is bringing you home be present for discharge instructions.  This will ensure a thorough understanding for your post op home care.          Thank you for your cooperation.  The Staff of [facility].            Bathing Instructions with Hibiclens    Shower the evening before and morning of your procedure with Chlorhexidine (Hibiclens)  do not use Chlorhexidine on your face or genitals. Do not get in your eyes.  Wash your face with water and your regular face wash/soap  Use your regular shampoo  Apply Chlorhexidine (Hibiclens) directly on your skin or on a wet washcloth and wash gently. When showering: Move away from the shower stream when applying Chlorhexidine (Hibiclens) to avoid rinsing off too soon.  Rinse thoroughly with warm water  Do not dilute Chlorhexidine (Hibiclens)   Dry off as usual, do not use any deodorant, powder, body lotions, perfume, after shave or cologne.

## 2024-07-11 ENCOUNTER — ANESTHESIA (OUTPATIENT)
Dept: SURGERY | Facility: OTHER | Age: 64
End: 2024-07-11
Payer: MEDICARE

## 2024-07-11 ENCOUNTER — HOSPITAL ENCOUNTER (OUTPATIENT)
Facility: OTHER | Age: 64
Discharge: HOME | End: 2024-07-11
Attending: SPECIALIST | Admitting: SPECIALIST
Payer: MEDICARE

## 2024-07-11 DIAGNOSIS — Z01.818 PRE-OP TESTING: Primary | ICD-10-CM

## 2024-07-11 DIAGNOSIS — Z99.2 CHRONIC KIDNEY DISEASE WITH END STAGE RENAL FAILURE ON DIALYSIS: ICD-10-CM

## 2024-07-11 DIAGNOSIS — N18.6 CHRONIC KIDNEY DISEASE WITH END STAGE RENAL FAILURE ON DIALYSIS: ICD-10-CM

## 2024-07-11 LAB
ABO + RH BLD: NORMAL
ABO GROUP BLD: NORMAL
ANION GAP SERPL CALC-SCNC: 9 MMOL/L (ref 8–16)
BASOPHILS # BLD AUTO: 0.03 K/UL (ref 0–0.2)
BASOPHILS NFR BLD: 0.5 % (ref 0–1.9)
BLD GP AB SCN CELLS X3 SERPL QL: NORMAL
BUN SERPL-MCNC: 25 MG/DL (ref 8–23)
CALCIUM SERPL-MCNC: 9.1 MG/DL (ref 8.7–10.5)
CHLORIDE SERPL-SCNC: 107 MMOL/L (ref 95–110)
CO2 SERPL-SCNC: 23 MMOL/L (ref 23–29)
CREAT SERPL-MCNC: 9.2 MG/DL (ref 0.5–1.4)
DIFFERENTIAL METHOD BLD: ABNORMAL
EOSINOPHIL # BLD AUTO: 0.3 K/UL (ref 0–0.5)
EOSINOPHIL NFR BLD: 4.9 % (ref 0–8)
ERYTHROCYTE [DISTWIDTH] IN BLOOD BY AUTOMATED COUNT: 14.4 % (ref 11.5–14.5)
EST. GFR  (NO RACE VARIABLE): 6 ML/MIN/1.73 M^2
GLUCOSE SERPL-MCNC: 90 MG/DL (ref 70–110)
HCT VFR BLD AUTO: 36.2 % (ref 40–54)
HGB BLD-MCNC: 11.8 G/DL (ref 14–18)
IMM GRANULOCYTES # BLD AUTO: 0.03 K/UL (ref 0–0.04)
IMM GRANULOCYTES NFR BLD AUTO: 0.5 % (ref 0–0.5)
LYMPHOCYTES # BLD AUTO: 1.6 K/UL (ref 1–4.8)
LYMPHOCYTES NFR BLD: 27.2 % (ref 18–48)
MCH RBC QN AUTO: 28 PG (ref 27–31)
MCHC RBC AUTO-ENTMCNC: 32.6 G/DL (ref 32–36)
MCV RBC AUTO: 86 FL (ref 82–98)
MONOCYTES # BLD AUTO: 0.7 K/UL (ref 0.3–1)
MONOCYTES NFR BLD: 11.9 % (ref 4–15)
NEUTROPHILS # BLD AUTO: 3.2 K/UL (ref 1.8–7.7)
NEUTROPHILS NFR BLD: 55 % (ref 38–73)
NRBC BLD-RTO: 0 /100 WBC
PLATELET # BLD AUTO: 179 K/UL (ref 150–450)
PMV BLD AUTO: 10.9 FL (ref 9.2–12.9)
POTASSIUM SERPL-SCNC: 4.4 MMOL/L (ref 3.5–5.1)
RBC # BLD AUTO: 4.22 M/UL (ref 4.6–6.2)
RH BLD: NORMAL
SODIUM SERPL-SCNC: 139 MMOL/L (ref 136–145)
SPECIMEN OUTDATE: NORMAL
WBC # BLD AUTO: 5.73 K/UL (ref 3.9–12.7)

## 2024-07-11 PROCEDURE — 85025 COMPLETE CBC W/AUTO DIFF WBC: CPT | Performed by: SPECIALIST

## 2024-07-11 PROCEDURE — 86901 BLOOD TYPING SEROLOGIC RH(D): CPT | Performed by: SPECIALIST

## 2024-07-11 PROCEDURE — 36415 COLL VENOUS BLD VENIPUNCTURE: CPT | Performed by: SPECIALIST

## 2024-07-11 PROCEDURE — 63600175 PHARM REV CODE 636 W HCPCS: Performed by: SPECIALIST

## 2024-07-11 PROCEDURE — 86850 RBC ANTIBODY SCREEN: CPT | Performed by: SPECIALIST

## 2024-07-11 PROCEDURE — 71000015 HC POSTOP RECOV 1ST HR: Performed by: SPECIALIST

## 2024-07-11 PROCEDURE — 25000003 PHARM REV CODE 250: Performed by: ANESTHESIOLOGY

## 2024-07-11 PROCEDURE — 37000009 HC ANESTHESIA EA ADD 15 MINS: Performed by: SPECIALIST

## 2024-07-11 PROCEDURE — 88304 TISSUE EXAM BY PATHOLOGIST: CPT | Performed by: PATHOLOGY

## 2024-07-11 PROCEDURE — 25000003 PHARM REV CODE 250: Performed by: SPECIALIST

## 2024-07-11 PROCEDURE — 71000016 HC POSTOP RECOV ADDL HR: Performed by: SPECIALIST

## 2024-07-11 PROCEDURE — 36000706: Performed by: SPECIALIST

## 2024-07-11 PROCEDURE — 71000033 HC RECOVERY, INTIAL HOUR: Performed by: SPECIALIST

## 2024-07-11 PROCEDURE — 25000003 PHARM REV CODE 250: Performed by: NURSE ANESTHETIST, CERTIFIED REGISTERED

## 2024-07-11 PROCEDURE — 71000039 HC RECOVERY, EACH ADD'L HOUR: Performed by: SPECIALIST

## 2024-07-11 PROCEDURE — 37000008 HC ANESTHESIA 1ST 15 MINUTES: Performed by: SPECIALIST

## 2024-07-11 PROCEDURE — C1768 GRAFT, VASCULAR: HCPCS | Performed by: SPECIALIST

## 2024-07-11 PROCEDURE — 36000707: Performed by: SPECIALIST

## 2024-07-11 PROCEDURE — 88304 TISSUE EXAM BY PATHOLOGIST: CPT | Mod: 26,,, | Performed by: PATHOLOGY

## 2024-07-11 PROCEDURE — 80048 BASIC METABOLIC PNL TOTAL CA: CPT | Performed by: SPECIALIST

## 2024-07-11 PROCEDURE — 27201423 OPTIME MED/SURG SUP & DEVICES STERILE SUPPLY: Performed by: SPECIALIST

## 2024-07-11 PROCEDURE — 86900 BLOOD TYPING SEROLOGIC ABO: CPT | Performed by: SPECIALIST

## 2024-07-11 PROCEDURE — 36833 AV FISTULA REVISION: CPT | Mod: RT,,, | Performed by: SPECIALIST

## 2024-07-11 PROCEDURE — 63600175 PHARM REV CODE 636 W HCPCS: Performed by: NURSE ANESTHETIST, CERTIFIED REGISTERED

## 2024-07-11 DEVICE — IMPLANTABLE DEVICE: Type: IMPLANTABLE DEVICE | Site: LEG | Status: FUNCTIONAL

## 2024-07-11 RX ORDER — HYDROCODONE BITARTRATE AND ACETAMINOPHEN 7.5; 325 MG/1; MG/1
1 TABLET ORAL EVERY 6 HOURS PRN
Qty: 28 TABLET | Refills: 0 | Status: SHIPPED | OUTPATIENT
Start: 2024-07-11

## 2024-07-11 RX ORDER — SODIUM CHLORIDE 0.9 % (FLUSH) 0.9 %
3 SYRINGE (ML) INJECTION
Status: DISCONTINUED | OUTPATIENT
Start: 2024-07-11 | End: 2024-07-11 | Stop reason: HOSPADM

## 2024-07-11 RX ORDER — LIDOCAINE HYDROCHLORIDE 20 MG/ML
INJECTION INTRAVENOUS
Status: DISCONTINUED | OUTPATIENT
Start: 2024-07-11 | End: 2024-07-11

## 2024-07-11 RX ORDER — LIDOCAINE HYDROCHLORIDE 10 MG/ML
0.5 INJECTION, SOLUTION EPIDURAL; INFILTRATION; INTRACAUDAL; PERINEURAL ONCE
Status: DISCONTINUED | OUTPATIENT
Start: 2024-07-11 | End: 2024-07-11 | Stop reason: HOSPADM

## 2024-07-11 RX ORDER — SODIUM CHLORIDE 9 MG/ML
INJECTION, SOLUTION INTRAVENOUS CONTINUOUS
Status: DISCONTINUED | OUTPATIENT
Start: 2024-07-11 | End: 2024-07-11 | Stop reason: HOSPADM

## 2024-07-11 RX ORDER — CEFAZOLIN SODIUM 1 G/3ML
2 INJECTION, POWDER, FOR SOLUTION INTRAMUSCULAR; INTRAVENOUS
Status: COMPLETED | OUTPATIENT
Start: 2024-07-11 | End: 2024-07-11

## 2024-07-11 RX ORDER — PROCHLORPERAZINE EDISYLATE 5 MG/ML
5 INJECTION INTRAMUSCULAR; INTRAVENOUS EVERY 30 MIN PRN
Status: DISCONTINUED | OUTPATIENT
Start: 2024-07-11 | End: 2024-07-11 | Stop reason: HOSPADM

## 2024-07-11 RX ORDER — GLUCAGON 1 MG
1 KIT INJECTION
Status: DISCONTINUED | OUTPATIENT
Start: 2024-07-11 | End: 2024-07-11 | Stop reason: HOSPADM

## 2024-07-11 RX ORDER — MEPERIDINE HYDROCHLORIDE 25 MG/ML
12.5 INJECTION INTRAMUSCULAR; INTRAVENOUS; SUBCUTANEOUS ONCE AS NEEDED
Status: DISCONTINUED | OUTPATIENT
Start: 2024-07-11 | End: 2024-07-11 | Stop reason: HOSPADM

## 2024-07-11 RX ORDER — OXYCODONE HYDROCHLORIDE 5 MG/1
5 TABLET ORAL EVERY 4 HOURS PRN
Status: DISCONTINUED | OUTPATIENT
Start: 2024-07-11 | End: 2024-07-11 | Stop reason: HOSPADM

## 2024-07-11 RX ORDER — ACETAMINOPHEN 500 MG
1000 TABLET ORAL
Status: COMPLETED | OUTPATIENT
Start: 2024-07-11 | End: 2024-07-11

## 2024-07-11 RX ORDER — EPHEDRINE SULFATE 50 MG/ML
INJECTION, SOLUTION INTRAVENOUS
Status: DISCONTINUED | OUTPATIENT
Start: 2024-07-11 | End: 2024-07-11

## 2024-07-11 RX ORDER — PROPOFOL 10 MG/ML
VIAL (ML) INTRAVENOUS
Status: DISCONTINUED | OUTPATIENT
Start: 2024-07-11 | End: 2024-07-11

## 2024-07-11 RX ORDER — LIDOCAINE HYDROCHLORIDE 10 MG/ML
1 INJECTION, SOLUTION EPIDURAL; INFILTRATION; INTRACAUDAL; PERINEURAL ONCE
Status: DISCONTINUED | OUTPATIENT
Start: 2024-07-11 | End: 2024-07-11 | Stop reason: HOSPADM

## 2024-07-11 RX ORDER — PREGABALIN 75 MG/1
75 CAPSULE ORAL ONCE
Status: COMPLETED | OUTPATIENT
Start: 2024-07-11 | End: 2024-07-11

## 2024-07-11 RX ORDER — DIPHENHYDRAMINE HYDROCHLORIDE 50 MG/ML
12.5 INJECTION INTRAMUSCULAR; INTRAVENOUS EVERY 30 MIN PRN
Status: DISCONTINUED | OUTPATIENT
Start: 2024-07-11 | End: 2024-07-11 | Stop reason: HOSPADM

## 2024-07-11 RX ORDER — FENTANYL CITRATE 50 UG/ML
INJECTION, SOLUTION INTRAMUSCULAR; INTRAVENOUS
Status: DISCONTINUED | OUTPATIENT
Start: 2024-07-11 | End: 2024-07-11

## 2024-07-11 RX ORDER — HEPARIN SODIUM 10000 [USP'U]/ML
INJECTION, SOLUTION INTRAVENOUS; SUBCUTANEOUS
Status: DISCONTINUED | OUTPATIENT
Start: 2024-07-11 | End: 2024-07-11 | Stop reason: HOSPADM

## 2024-07-11 RX ORDER — VASOPRESSIN 20 [USP'U]/ML
INJECTION, SOLUTION INTRAMUSCULAR; SUBCUTANEOUS
Status: DISCONTINUED | OUTPATIENT
Start: 2024-07-11 | End: 2024-07-11

## 2024-07-11 RX ORDER — SODIUM CHLORIDE, SODIUM LACTATE, POTASSIUM CHLORIDE, CALCIUM CHLORIDE 600; 310; 30; 20 MG/100ML; MG/100ML; MG/100ML; MG/100ML
INJECTION, SOLUTION INTRAVENOUS CONTINUOUS
Status: DISCONTINUED | OUTPATIENT
Start: 2024-07-11 | End: 2024-07-11 | Stop reason: HOSPADM

## 2024-07-11 RX ORDER — HYDROMORPHONE HYDROCHLORIDE 2 MG/ML
0.4 INJECTION, SOLUTION INTRAMUSCULAR; INTRAVENOUS; SUBCUTANEOUS EVERY 5 MIN PRN
Status: DISCONTINUED | OUTPATIENT
Start: 2024-07-11 | End: 2024-07-11 | Stop reason: HOSPADM

## 2024-07-11 RX ORDER — LIDOCAINE HYDROCHLORIDE AND EPINEPHRINE 10; 10 MG/ML; UG/ML
INJECTION, SOLUTION INFILTRATION; PERINEURAL
Status: DISCONTINUED | OUTPATIENT
Start: 2024-07-11 | End: 2024-07-11 | Stop reason: HOSPADM

## 2024-07-11 RX ADMIN — OXYCODONE HYDROCHLORIDE 5 MG: 5 TABLET ORAL at 11:07

## 2024-07-11 RX ADMIN — GLYCOPYRROLATE 0.2 MG: 0.2 INJECTION, SOLUTION INTRAMUSCULAR; INTRAVITREAL at 09:07

## 2024-07-11 RX ADMIN — ACETAMINOPHEN 1000 MG: 500 TABLET ORAL at 07:07

## 2024-07-11 RX ADMIN — SODIUM CHLORIDE: 0.9 INJECTION, SOLUTION INTRAVENOUS at 09:07

## 2024-07-11 RX ADMIN — PREGABALIN 75 MG: 75 CAPSULE ORAL at 07:07

## 2024-07-11 RX ADMIN — PROPOFOL 20 MG: 10 INJECTION, EMULSION INTRAVENOUS at 10:07

## 2024-07-11 RX ADMIN — EPHEDRINE SULFATE 5 MG: 50 INJECTION INTRAVENOUS at 09:07

## 2024-07-11 RX ADMIN — VASOPRESSIN 1 UNITS: 20 INJECTION, SOLUTION INTRAMUSCULAR; SUBCUTANEOUS at 09:07

## 2024-07-11 RX ADMIN — PROPOFOL 150 MG: 10 INJECTION, EMULSION INTRAVENOUS at 09:07

## 2024-07-11 RX ADMIN — FENTANYL CITRATE 50 MCG: 50 INJECTION, SOLUTION INTRAMUSCULAR; INTRAVENOUS at 09:07

## 2024-07-11 RX ADMIN — VASOPRESSIN 1 UNITS: 20 INJECTION, SOLUTION INTRAMUSCULAR; SUBCUTANEOUS at 10:07

## 2024-07-11 RX ADMIN — CEFAZOLIN 2 G: 330 INJECTION, POWDER, FOR SOLUTION INTRAMUSCULAR; INTRAVENOUS at 09:07

## 2024-07-11 RX ADMIN — OXYCODONE HYDROCHLORIDE 5 MG: 5 TABLET ORAL at 01:07

## 2024-07-11 RX ADMIN — LIDOCAINE HYDROCHLORIDE 75 MG: 20 INJECTION, SOLUTION INTRAVENOUS at 09:07

## 2024-07-11 NOTE — ANESTHESIA POSTPROCEDURE EVALUATION
Anesthesia Post Evaluation    Patient: Glenn Collier    Procedure(s) Performed: Procedure(s) (LRB):  REVISION, AV FISTULA / RIGHT LEG (Right)    Final Anesthesia Type: general      Patient location during evaluation: PACU  Patient participation: Yes- Able to Participate  Level of consciousness: awake and alert  Post-procedure vital signs: reviewed and stable  Pain management: adequate  Airway patency: patent    PONV status at discharge: No PONV  Anesthetic complications: no      Cardiovascular status: blood pressure returned to baseline  Respiratory status: unassisted and spontaneous ventilation  Hydration status: euvolemic  Follow-up not needed.              Vitals Value Taken Time   /63 07/11/24 1335   Temp 36.6 °C (97.8 °F) 07/11/24 1235   Pulse 59 07/11/24 1335   Resp 16 07/11/24 1335   SpO2 100 % 07/11/24 1335         Event Time   Out of Recovery 12:29:00         Pain/Manny Score: Pain Rating Prior to Med Admin: 6 (7/11/2024  1:16 PM)  Pain Rating Post Med Admin: 4 (7/11/2024  1:35 PM)  Manny Score: 10 (7/11/2024  1:35 PM)

## 2024-07-11 NOTE — ANESTHESIA PROCEDURE NOTES
Intubation    Date/Time: 7/11/2024 9:15 AM    Performed by: Brittni Cesar CRNA  Authorized by: Mainor Capellan MD    Intubation:     Induction:  Intravenous    Intubated:  Postinduction    Mask Ventilation:  Not attempted    Attempts:  1    Attempted By:  CRNA    Difficult Airway Encountered?: No      Complications:  None    Airway Device:  Supraglottic airway/LMA    Airway Device Size:  4.0    Placement Verified By:  Capnometry    Complicating Factors:  None    Findings Post-Intubation:  BS equal bilateral and atraumatic/condition of teeth unchanged

## 2024-07-11 NOTE — PLAN OF CARE
Glenn Collier has met all discharge criteria from Phase II. Vital Signs are stable, ambulating  without difficulty. Discharge instructions given, patient verbalized understanding. Discharged from facility via wheelchair in stable condition.      well appearing

## 2024-07-11 NOTE — OR NURSING
VSS. Pt states pain is tolerable. Dressing and PIV C/D/I. JENNIFFER drain intact. Meets Phase I discharge criteria. Ready for transfer to Phase II. Family notified.

## 2024-07-11 NOTE — OP NOTE
[facility] - Surgery ([place])  Operative Note    SUMMARY     Surgery Date: 7/11/2024     Surgeons and Role:     * Adrián Damian Jr., MD - Primary    Assisting Surgeon: None    Pre-op Diagnosis:  End stage renal disease [N18.6]  Encounter for extracorporeal dialysis [Z99.2]    Post-op Diagnosis:  Post-Op Diagnosis Codes:     * End stage renal disease [N18.6]     * Encounter for extracorporeal dialysis [Z99.2]    Procedure(s) (LRB):  REVISION, AV FISTULA / RIGHT LEG (Right), excision of pseudoaneurysms, construction of interposition graft    Anesthesia: General    Description of Procedure:  The patient was brought to the operating room and placed in supine position.  The right lower extremity prepped and draped in a sterile fashion.  The patient had a longstanding Lewis-Rd AV fistula present in the right thigh.  The medial limb of the loop fistula had a large pseudoaneurysms present.  A longitudinal incision was made proximal and distal to the area of aneurysmal dilatation.  Using sharp dissection the Lewis-Rd fistula proximal and distal to the aneurysmal segment was isolated.  The graft then transected.  Heparinized saline instilled into the arterial and venous ends of the fistula.  A longitudinal incision was then made over the length of the aneurysmal segment.  Using sharp dissection was carried down through the subcutaneous tissue and the graft excised.  Hemostasis obtained with electrocautery Bovie.  A separate subcutaneous tissue tunnel was then made along the medial aspect of the fistula.  An 8 mm standard wall PTFE graft was then prepared the graft then brought through a subcutaneous tissue tunnel parallel to the old graft.  The proximal and distal ends of the interposition graft then sewn end-to-end to the arterial and venous remnants of the graft using continuous sutures of running 6 0 Lewis-Rd suture.  After hemostasis had been obtained the wounds were then closed in layers with interrupted 3-0 Vicryl and  running 4-0 Monocryl.  A 15 Dwain drain was placed in the depths of the wound where the aneurysmal graft had been excised.  The drain was brought through a separate stab incision and then secured with 3-0 nylon.  The wounds sterilely dressed.  The patient tolerated the procedure well left the operating room good condition.  The end of the procedure all sponge, lap instrument counts were correct.  Estimated blood loss-50 cc  Estimated Blood Loss: * No values recorded between 7/11/2024  9:44 AM and 7/11/2024 11:11 AM *         Specimens:   Specimen (24h ago, onward)       Start     Ordered    07/11/24 1005  Specimen to Pathology, Surgery General Surgery  Once        Comments: Pre-op Diagnosis: End stage renal disease [N18.6]Encounter for extracorporeal dialysis [Z99.2]Procedure(s):REVISION, AV FISTULA / RIGHT LEG Number of specimens: 1Name of specimens: 1/right dialysis graft     References:    Click here for ordering Quick Tip   Question Answer Comment   Procedure Type: General Surgery    Release to patient Immediate        07/11/24 1005                        SUMMARY     Admit Date:     Discharge Date and Time:  07/11/2024 12:16 PM    Hospital Course (synopsis of major diagnoses, care, treatment, and services provided during the course of the hospital stay):  Benign     Final Diagnosis: Post-Op Diagnosis Codes:     * End stage renal disease [N18.6]     * Encounter for extracorporeal dialysis [Z99.2]    Disposition: Home or Self Care    Follow Up/Patient Instructions:     Medications:  Reconciled Home Medications:      Medication List        START taking these medications      HYDROcodone-acetaminophen 7.5-325 mg per tablet  Commonly known as: NORCO  Take 1 tablet by mouth every 6 (six) hours as needed for Pain.            CONTINUE taking these medications      acyclovir 400 MG tablet  Commonly known as: ZOVIRAX  Take 1 tablet (400 mg total) by mouth 3 (three) times daily as needed (Take for 5 days for this  episode).     albuterol sulfate 90 mcg/actuation Aebs  Inhale 180 mcg into the lungs every 4 (four) hours.     amLODIPine 10 MG tablet  Commonly known as: NORVASC  Take 1 tablet (10 mg total) by mouth once daily.     atorvastatin 40 MG tablet  Commonly known as: LIPITOR  Take 1 tablet (40 mg total) by mouth once daily.     cinacalcet 60 MG Tab  Commonly known as: SENSIPAR  Take 60 mg by mouth once daily. Take 60 mg daily at bedtime     clopidogreL 75 mg tablet  Commonly known as: PLAVIX  Take 1 tablet (75 mg total) by mouth once daily.     latanoprost 0.005 % ophthalmic solution  1 drop every evening.     losartan 100 MG tablet  Commonly known as: COZAAR  Take 1 tablet (100 mg total) by mouth once daily.     * NEPHROCAPS ORAL  Take 1 capsule by mouth once daily.     * VIRT-CAPS 1 mg Cap  Generic drug: vitamin renal formula (B-complex-vitamin c-folic acid)  Take 1 capsule by mouth.     sevelamer carbonate 800 mg Tab  Commonly known as: RENVELA  Take 800 mg by mouth 3 (three) times daily with meals.     tadalafiL 5 MG tablet  Commonly known as: CIALIS  Take 5 mg by mouth daily as needed for Erectile Dysfunction.     vitamin D 1000 units Tab  Commonly known as: VITAMIN D3  Take 1,000 Units by mouth once daily. Take 1 capsule daily.           * This list has 2 medication(s) that are the same as other medications prescribed for you. Read the directions carefully, and ask your doctor or other care provider to review them with you.                Discharge Procedure Orders   Diet general     Call MD for:  temperature >100.4     Call MD for:  persistent nausea and vomiting     Call MD for:  severe uncontrolled pain     Call MD for:  difficulty breathing, headache or visual disturbances     Call MD for:  redness, tenderness, or signs of infection (pain, swelling, redness, odor or green/yellow discharge around incision site)     Wound care routine (specify)   Order Comments: Wound care routine:  Leave Prineo intact  Sponge  bath only  Teach patient care of drain      Follow-up Information       Adrián Damian Jr., MD Follow up in 1 week(s).    Specialties: General Surgery, Vascular Surgery  Contact information:  [address]  [address] [address] [phone]

## 2024-07-11 NOTE — CONSULTS
"   Expand All Collapse All  History & Physical        Subjective  History of Present Illness:  Patient is a 63 y.o. male presents with longstanding AV graft right leg for revision.  Patient with pseudoaneurysm along medial aspect of graft.  No skin ulceration or breakdown.  Positive thrill.  History of CVA with recovery.  Hypertension, hyperlipidemia.          Chief Complaint   Patient presents with    Other       access               Review of patient's allergies indicates:   Allergen Reactions    Minoxidil Itching, Swelling and Other (See Comments)       " drops blood pressure."          Current Medications          Current Outpatient Medications   Medication Sig Dispense Refill    acyclovir (ZOVIRAX) 400 MG tablet Take 1 tablet (400 mg total) by mouth 3 (three) times daily as needed (Take for 5 days for this episode). 15 tablet 0    albuterol sulfate 90 mcg/actuation aebs Inhale 180 mcg into the lungs every 4 (four) hours.        amLODIPine (NORVASC) 10 MG tablet Take 1 tablet (10 mg total) by mouth once daily. 90 tablet 3    atorvastatin (LIPITOR) 40 MG tablet Take 1 tablet (40 mg total) by mouth once daily. 90 tablet 3    B COMPLEX & C NO.20/FOLIC ACID (NEPHROCAPS ORAL) Take 1 capsule by mouth once daily.        cinacalcet (SENSIPAR) 60 MG Tab Take 60 mg by mouth once daily. Take 60 mg daily at bedtime        clopidogreL (PLAVIX) 75 mg tablet Take 1 tablet (75 mg total) by mouth once daily. 90 tablet 3    ibuprofen (ADVIL,MOTRIN) 600 MG tablet Take 1 tablet (600 mg total) by mouth every 6 (six) hours as needed for Pain. (Patient not taking: Reported on 4/18/2024) 20 tablet 0    latanoprost 0.005 % ophthalmic solution 1 drop every evening.        losartan (COZAAR) 100 MG tablet Take 1 tablet (100 mg total) by mouth once daily. 90 tablet 3    sevelamer carbonate (RENVELA) 800 mg Tab Take 800 mg by mouth 3 (three) times daily with meals.        tadalafil (CIALIS) 5 MG tablet Take 5 mg by mouth daily as needed for " Erectile Dysfunction.        VIRT-CAPS 1 mg Cap Take 1 capsule by mouth.        vitamin D (VITAMIN D3) 1000 units Tab Take 1,000 Units by mouth once daily. Take 1 capsule daily.          No current facility-administered medications for this visit.                Facility-Administered Medications Ordered in Other Visits   Medication Dose Route Frequency Provider Last Rate Last Admin    heparin (porcine) injection  500 Units/hr Intravenous Continuous Bodana, Steve, DO 0.5 mL/hr at 03/15/24 0602 500 Units/hr at 03/15/24 0602                 Past Medical History:   Diagnosis Date    Cancer       RCC s/p bilateral nephrectomy    Cardiomegaly      Dialysis patient      Encounter for blood transfusion      Glaucoma      H/O colonoscopy      Hyperlipemia      Hypertension      Renal disorder              Past Surgical History:   Procedure Laterality Date    DIALYSIS FISTULA CREATION         in arm and groin on R side     KIDNEY SURGERY        KIDNEY TRANSPLANT   kidney cancer    NEPHRECTOMY Bilateral Right 2009;  Left 2010     RCC both sides             Family History   Problem Relation Name Age of Onset    Heart disease Mother        Heart disease Father        Diabetes Father        Hypertension Father          Social History   Social History           Tobacco Use    Smoking status: Never    Smokeless tobacco: Never   Substance Use Topics    Alcohol use: No    Drug use: No            Review of Systems:  Review of Systems   Constitutional: Negative.  Negative for fatigue and fever.   HENT: Negative.  Negative for nosebleeds, sore throat and trouble swallowing.    Eyes: Negative.    Respiratory: Negative.     Cardiovascular: Negative.  Negative for chest pain.   Gastrointestinal: Negative.    Genitourinary: Negative.    Musculoskeletal: Negative.    Skin: Negative.    Neurological: Negative.    Psychiatric/Behavioral: Negative.                    Objective  Vital Signs (Most Recent)  Pulse: 73 (06/25/24 1243)  BP: (!)  "141/82 (06/25/24 1243)  SpO2: 100 % (06/25/24 1243)  5' 11" (1.803 m)  89.4 kg (197 lb)      Physical Exam:  Physical Exam  Constitutional:       General: He is not in acute distress.     Appearance: He is well-developed and normal weight. He is not ill-appearing.   HENT:      Head: Normocephalic and atraumatic.      Right Ear: External ear normal.      Left Ear: External ear normal.   Eyes:      General: No scleral icterus.     Extraocular Movements: Extraocular movements intact.      Pupils: Pupils are equal, round, and reactive to light.   Cardiovascular:      Rate and Rhythm: Normal rate and regular rhythm.      Heart sounds: Normal heart sounds. No murmur heard.     No friction rub. No gallop.   Pulmonary:      Effort: No respiratory distress.      Breath sounds: Normal breath sounds. No stridor. No wheezing, rhonchi or rales.   Abdominal:      General: Bowel sounds are normal.      Palpations: Abdomen is soft.   Musculoskeletal:         General: Normal range of motion.      Cervical back: Neck supple.      Comments: Arteriovenous fistula right thigh.  Pseudoaneurysmal dilatation medial limb of graft   Skin:     General: Skin is warm and dry.      Coloration: Skin is not jaundiced.      Findings: No bruising or rash.   Neurological:      General: No focal deficit present.      Mental Status: He is alert and oriented to person, place, and time.      Motor: No weakness.      Coordination: Coordination normal.   Psychiatric:         Mood and Affect: Mood normal.         Behavior: Behavior normal.         Thought Content: Thought content normal.         Judgment: Judgment normal.                  Assessment and Plan  Pseudoaneurysms of AV graft right thigh for revision.  Consents reviewed patient and signed           "

## 2024-07-11 NOTE — TRANSFER OF CARE
"Anesthesia Transfer of Care Note    Patient: Glenn Collier    Procedure(s) Performed: Procedure(s) (LRB):  REVISION, AV FISTULA / RIGHT LEG (Right)    Patient location: PACU    Anesthesia Type: general    Transport from OR: Transported from OR on 6-10 L/min O2 by face mask with adequate spontaneous ventilation    Post pain: adequate analgesia    Post assessment: no apparent anesthetic complications and tolerated procedure well    Post vital signs: stable    Level of consciousness: awake and alert    Nausea/Vomiting: no nausea/vomiting    Complications: none    Transfer of care protocol was followed      Last vitals: Visit Vitals  /62 (BP Location: Left arm, Patient Position: Lying)   Pulse (!) 54   Temp 36.4 °C (97.5 °F) (Skin)   Resp 16   Ht 5' 11" (1.803 m)   Wt 87.1 kg (192 lb)   SpO2 100%   BMI 26.78 kg/m²     "

## 2024-07-12 VITALS
DIASTOLIC BLOOD PRESSURE: 63 MMHG | HEART RATE: 59 BPM | TEMPERATURE: 98 F | BODY MASS INDEX: 26.88 KG/M2 | SYSTOLIC BLOOD PRESSURE: 124 MMHG | HEIGHT: 71 IN | RESPIRATION RATE: 16 BRPM | OXYGEN SATURATION: 100 % | WEIGHT: 192 LBS

## 2024-07-15 LAB
FINAL PATHOLOGIC DIAGNOSIS: NORMAL
GROSS: NORMAL
Lab: NORMAL

## 2024-07-18 ENCOUNTER — OFFICE VISIT (OUTPATIENT)
Dept: SURGERY | Facility: CLINIC | Age: 64
End: 2024-07-18
Attending: SPECIALIST
Payer: MEDICARE

## 2024-07-18 VITALS — SYSTOLIC BLOOD PRESSURE: 161 MMHG | HEART RATE: 68 BPM | OXYGEN SATURATION: 100 % | DIASTOLIC BLOOD PRESSURE: 72 MMHG

## 2024-07-18 DIAGNOSIS — T82.599A MECHANICAL COMPLICATION OF VASCULAR DEVICE, INITIAL ENCOUNTER: Primary | ICD-10-CM

## 2024-07-18 PROCEDURE — 99213 OFFICE O/P EST LOW 20 MIN: CPT | Mod: PBBFAC | Performed by: SPECIALIST

## 2024-07-18 PROCEDURE — 99024 POSTOP FOLLOW-UP VISIT: CPT | Mod: POP,,, | Performed by: SPECIALIST

## 2024-07-18 PROCEDURE — 99999 PR PBB SHADOW E&M-EST. PATIENT-LVL III: CPT | Mod: PBBFAC,,, | Performed by: SPECIALIST

## 2024-07-18 NOTE — PROGRESS NOTES
63-year-old male with end-stage renal disease and longstanding loop [address] graft right thigh  Patient with large pseudoaneurysms along medial aspect of graft  Patient with revision of AVG and placement of new medial (venous) limb of loop on 07/11/2024    Subjective   No complaints    PE  AVG functioning without issue.  Incisions healing without evidence of infection      Impression/plan   Surgically stable post revision of AVG  RTC 2 weeks  Drain removed

## 2024-07-22 ENCOUNTER — HOSPITAL ENCOUNTER (EMERGENCY)
Facility: OTHER | Age: 64
Discharge: HOME | End: 2024-07-22
Attending: EMERGENCY MEDICINE
Payer: MEDICARE

## 2024-07-22 ENCOUNTER — OFFICE VISIT (OUTPATIENT)
Dept: SURGERY | Facility: CLINIC | Age: 64
End: 2024-07-22
Attending: SPECIALIST
Payer: MEDICARE

## 2024-07-22 VITALS
SYSTOLIC BLOOD PRESSURE: 134 MMHG | RESPIRATION RATE: 18 BRPM | HEART RATE: 62 BPM | DIASTOLIC BLOOD PRESSURE: 60 MMHG | TEMPERATURE: 98 F | BODY MASS INDEX: 26.88 KG/M2 | OXYGEN SATURATION: 99 % | HEIGHT: 71 IN | WEIGHT: 192 LBS

## 2024-07-22 VITALS — SYSTOLIC BLOOD PRESSURE: 150 MMHG | HEART RATE: 64 BPM | DIASTOLIC BLOOD PRESSURE: 70 MMHG | OXYGEN SATURATION: 100 %

## 2024-07-22 DIAGNOSIS — T82.599A MECHANICAL COMPLICATION OF VASCULAR DEVICE, INITIAL ENCOUNTER: Primary | ICD-10-CM

## 2024-07-22 DIAGNOSIS — L03.115 CELLULITIS OF RIGHT LOWER EXTREMITY: Primary | ICD-10-CM

## 2024-07-22 LAB
CREAT SERPL-MCNC: 7.8 MG/DL (ref 0.5–1.4)
EST. GFR  (NO RACE VARIABLE): 7 ML/MIN/1.73 M^2

## 2024-07-22 PROCEDURE — 99024 POSTOP FOLLOW-UP VISIT: CPT | Mod: POP,,, | Performed by: SPECIALIST

## 2024-07-22 PROCEDURE — 82565 ASSAY OF CREATININE: CPT | Performed by: NURSE PRACTITIONER

## 2024-07-22 PROCEDURE — 96367 TX/PROPH/DG ADDL SEQ IV INF: CPT

## 2024-07-22 PROCEDURE — 99999 PR PBB SHADOW E&M-EST. PATIENT-LVL III: CPT | Mod: PBBFAC,,, | Performed by: SPECIALIST

## 2024-07-22 PROCEDURE — 99284 EMERGENCY DEPT VISIT MOD MDM: CPT | Mod: 25,27

## 2024-07-22 PROCEDURE — 99213 OFFICE O/P EST LOW 20 MIN: CPT | Mod: PBBFAC,25 | Performed by: SPECIALIST

## 2024-07-22 PROCEDURE — 63600175 PHARM REV CODE 636 W HCPCS: Performed by: NURSE PRACTITIONER

## 2024-07-22 PROCEDURE — 25000003 PHARM REV CODE 250: Performed by: NURSE PRACTITIONER

## 2024-07-22 PROCEDURE — 96365 THER/PROPH/DIAG IV INF INIT: CPT

## 2024-07-22 RX ORDER — GENTAMICIN SULFATE 100 MG/100ML
100 INJECTION, SOLUTION INTRAVENOUS
Status: DISCONTINUED | OUTPATIENT
Start: 2024-07-22 | End: 2024-07-22

## 2024-07-22 RX ADMIN — GENTAMICIN SULFATE 160 MG: 40 INJECTION, SOLUTION INTRAMUSCULAR; INTRAVENOUS at 02:07

## 2024-07-22 RX ADMIN — VANCOMYCIN HYDROCHLORIDE 1000 MG: 1 INJECTION, POWDER, LYOPHILIZED, FOR SOLUTION INTRAVENOUS at 12:07

## 2024-07-22 NOTE — PROGRESS NOTES
63-year-old male with end-stage renal disease on longstanding hemodialysis   Patient with functional SVC occlusion   Patient has been dialyzed through right thigh loop graft for several years   On 07/11/2024 patient had replacement of medial limb of graft and excision of aneurysmal segment of AV fistula   Patient noted today to have localized brawny edema while at dialysis today  No fever during dialysis, cultures drawn at dialysis    Subjective   No fever, chills, jaundice, diaphoresis      PE   Some erythema and brawny edema along tract of new graft segment and area of excision of old aneurysmal graft  No drainage or discharge    Impression/plan   Cellulitis of leg   IV antibiotics:  1 g vancomycin, 100 mg gentamicin to be given in ER  Spoke with dialysis nurse Arabella, we will obtain vanc and Gent levels, maintain on IV antibiotics proximally 2 weeks

## 2024-07-22 NOTE — ED PROVIDER NOTES
Source of History:  Patient     Chief complaint:  Sent by MD (Pt sent by Dr. Damian for IV antibiotic after surgery. )      HPI:  Glenn Collier is a 63 y.o. male presenting to the emergency department with need for IV antibiotics.  Patient had a  revision of his AV fistula 11 days ago.  Had dialysis appointment today was noted to have some redness of his surgical incision along with some swelling of the right lower leg.  He was evaluated by her surgeon is him to the ED for a dose of vancomycin and gentamicin.  Plan for IV antibiotics during dialysis sessions for the next 2 weeks.  Patient denies any fever/chills.  Denies any calf pain.  Denies any chest pain or shortness breath.    This is the extent to the patients complaints today here in the emergency department.    PMH:  As per HPI and below:  Past Medical History:   Diagnosis Date    Cancer     RCC s/p bilateral nephrectomy 2009 right 2010 left    Cardiomegaly     Dialysis patient     Encounter for blood transfusion     Glaucoma     H/O colonoscopy     Hyperlipemia     Hypertension     Renal disorder      Past Surgical History:   Procedure Laterality Date    DIALYSIS FISTULA CREATION      in arm and groin on R side     KIDNEY SURGERY      right 2009 left 2010    NEPHRECTOMY Bilateral Right 2009;  Left 2010    RCC both sides    PLACEMENT OF ARTERIOVENOUS GRAFT  7/11/2024    Procedure: INSERTION, GRAFT, ARTERIOVENOUS;  Surgeon: Adrián Damian Jr., MD;  Location: [facility] OR;  Service: General;;    PSEUDOANEURYSM REPAIR  7/11/2024    Procedure: REPAIR, PSEUDOANEURYSM;  Surgeon: Adrián Damian Jr., MD;  Location: [facility] OR;  Service: General;;    REVISION OF ARTERIOVENOUS FISTULA Right 7/11/2024    Procedure: REVISION, AV FISTULA / RIGHT LEG;  Surgeon: Adrián Damian Jr., MD;  Location: [facility] OR;  Service: General;  Laterality: Right;       Social History     Tobacco Use    Smoking status: Never    Smokeless tobacco: Never   Substance Use Topics    Alcohol use: No    Drug  "use: No       Review of patient's allergies indicates:   Allergen Reactions    Minoxidil Hives, Itching, Swelling, Rash and Other (See Comments)     " drops blood pressure."        ROS: As per HPI and below:  General: No fever.  No chills.  Eyes: No visual changes.   ENT: No sore throat. No ear pain.  Urinary: No abnormal urination.  MSK:  Right leg swelling  Integument:  Grafts to the right leg      Physical Exam:    /60 (BP Location: Left arm)   Pulse 62   Temp 98.2 °F (36.8 °C) (Oral)   Resp 18   Ht 5' 11" (1.803 m)   Wt 87.1 kg (192 lb)   SpO2 99%   BMI 26.78 kg/m²   Vitals:    07/22/24 1129   BP: 134/60   Pulse: 62   Resp: 18   Temp: 98.2 °F (36.8 °C)   TempSrc: Oral   SpO2: 99%   Weight: 87.1 kg (192 lb)   Height: 5' 11" (1.803 m)       Nursing note and vital signs reviewed.  Appearance: No acute distress.  Eyes: No conjunctival injection.  Extraocular muscles are intact.  ENT: Normal phonation.  Cardio:  DP +2 bilaterally  Musculoskeletal:  +1 pitting edema to the right lower leg.  No calf pain or tenderness.  Skin:  AV graft to the right upper thigh, mild surrounding erythema is present, no drainage  Neuro:  No neuro deficits.  Mental Status:  Alert and oriented x 3.  Appropriate, conversant.      Abnormal Labs Reviewed   CREATININE, SERUM - Abnormal; Notable for the following components:       Result Value    Creatinine 7.8 (*)     eGFR 7 (*)     All other components within normal limits       No orders to display         Initial Impression/ Differential Dx:  Cellulitis, DVT    Medical Decision Making  63-year-old male with recent AV graft revision to the right leg, noticed to have some surrounding erythema at dialysis today.  Was evaluated by his surgeon who sent him to the ED for IV dose of vanc and gentamicin.  On exam he has some plus one pitting edema to his right leg, no calf tenderness, or redness to the calf.  Low concern for DVT.  Extremity is warm to touch with good pedal pulses.  He " was given 1 g of vancomycin and 160 mg of gentamicin in the ED. plan for receiving IV antibiotics at his dialysis sessions.  I did discuss if he develops any increasing pain, redness, swelling or fever development he was to return to emergency department for re-evaluation.  He stated understanding.         MDM:         Diagnostic Impression:    1. Cellulitis of right lower extremity         ED Disposition Condition    Discharge Stable            ED Prescriptions    None       Follow-up Information       Follow up With Specialties Details Why Contact Info    [facility] - Emergency Dept Emergency Medicine Go to  If symptoms worsen [address] [facility] [id number] [phone]               Cayla Marti, [facility]  07/22/24 1730       Cayla Marti, [facility]  07/22/24 1741

## 2024-07-22 NOTE — PROGRESS NOTES
"Pharmacokinetic Initial Assessment: Gentamicin    Assessment:  Weight utilized for dose calculation: Adjusted Body Weight  Dosing method utilized: conventional dosing (not a candidate for extended interval due to severe renal impairment (CrCl <30 mL/min))    Plan: Traditional dosing regimen: Gentamicin 160 mg IVPB once.  Schedule a random level at least 2 hours post dialysis Wednesday 7/24 to determine redosing time-frame.    Pharmacy will continue to monitor.    Please contact pharmacy at extension [phone] with any questions regarding this assessment.    Thank you for the consult,    Yolanda Velasco       Patient brief summary:  Glenn Collier is a 63 y.o. male initiated on aminoglycoside therapy for treatment of suspected skin & soft tissue infection    Drug Allergies:   Review of patient's allergies indicates:   Allergen Reactions    Minoxidil Hives, Itching, Swelling, Rash and Other (See Comments)     " drops blood pressure."        Actual Body Weight:   87.1kg    Adjust Body Weight:   80kg    Ideal Body Weight:  75.3kg    Renal Function:   Estimated Creatinine Clearance: 10.6 mL/min (A) (based on SCr of 7.8 mg/dL (H)).,     Dialysis Method (if applicable):  intermittent HD - MWF    CBC (last 72 hours):  No results for input(s): "WHITE BLOOD CELL COUNT", "HEMOGLOBIN", "HEMATOCRIT", "PLATELETS", "GRAN%", "LYMPH%", "MONO%", "EOSINOPHIL%", "BASOPHIL%", "DIFFERENTIAL METHOD" in the last 72 hours.    Metabolic Panel (last 72 hours):  Recent Labs   Lab Result Units 07/22/24  1236   Creatinine mg/dL 7.8*       Microbiologic Results:  Microbiology Results (last 7 days)       ** No results found for the last 168 hours. **          "

## 2024-07-22 NOTE — DISCHARGE INSTRUCTIONS
Follow up with Dr Damian.  If you develop worsening symptoms including increased pain, redness, drainage or fever, please return to the ED for reevaluation.

## 2024-07-22 NOTE — ED TRIAGE NOTES
S/p AV graft placement right thigh on 7/11/24. Patient began having swelling and drainage to site on Friday. No fever reported. Presented to dialysis today and was advised to come to ED for antibiotic treatment. Dialysis completed today. Presents awake, alert.

## 2024-08-01 ENCOUNTER — OFFICE VISIT (OUTPATIENT)
Dept: SURGERY | Facility: CLINIC | Age: 64
End: 2024-08-01
Attending: SPECIALIST
Payer: MEDICARE

## 2024-08-01 VITALS — SYSTOLIC BLOOD PRESSURE: 132 MMHG | DIASTOLIC BLOOD PRESSURE: 71 MMHG | OXYGEN SATURATION: 99 % | HEART RATE: 65 BPM

## 2024-08-01 DIAGNOSIS — T82.599A MECHANICAL COMPLICATION OF VASCULAR DEVICE, INITIAL ENCOUNTER: Primary | ICD-10-CM

## 2024-08-01 PROCEDURE — 99024 POSTOP FOLLOW-UP VISIT: CPT | Mod: POP,,, | Performed by: SPECIALIST

## 2024-08-01 PROCEDURE — 99213 OFFICE O/P EST LOW 20 MIN: CPT | Mod: PBBFAC | Performed by: SPECIALIST

## 2024-08-01 PROCEDURE — 99999 PR PBB SHADOW E&M-EST. PATIENT-LVL III: CPT | Mod: PBBFAC,,, | Performed by: SPECIALIST

## 2024-08-01 NOTE — PROGRESS NOTES
63-year-old male with end-stage renal disease on longstanding hemodialysis   Patient with functional SVC occlusion   Patient has been dialyzed through right thigh loop graft for several years   On 07/11/2024 patient had replacement of medial limb of graft and excision of aneurysmal segment of AV fistula   Patient placed on prophylactic gentle/vanc during dialysis   Developed urticaria after recent dosage and antibiotic stopped    Subjective   No fever, chills, diaphoresis    PE   AVG with good thrill, wounds healing with 1 small area of superficial skin dehiscence that does not go through dermis    Impression/plan   Surgically stable, no evidence of graft infection  Local care   RTC 2 weeks

## 2024-08-06 ENCOUNTER — HOSPITAL ENCOUNTER (OUTPATIENT)
Facility: OTHER | Age: 64
Discharge: HOME | End: 2024-08-07
Attending: EMERGENCY MEDICINE | Admitting: HOSPITALIST
Payer: MEDICARE

## 2024-08-06 ENCOUNTER — NURSE TRIAGE (OUTPATIENT)
Dept: ADMINISTRATIVE | Facility: CLINIC | Age: 64
End: 2024-08-06
Payer: MEDICARE

## 2024-08-06 DIAGNOSIS — N18.6 ANEMIA IN CHRONIC KIDNEY DISEASE, ON CHRONIC DIALYSIS: Primary | ICD-10-CM

## 2024-08-06 DIAGNOSIS — R27.0 ATAXIA: ICD-10-CM

## 2024-08-06 DIAGNOSIS — Z99.2 ANEMIA IN CHRONIC KIDNEY DISEASE, ON CHRONIC DIALYSIS: Primary | ICD-10-CM

## 2024-08-06 DIAGNOSIS — D63.1 ANEMIA IN CHRONIC KIDNEY DISEASE, ON CHRONIC DIALYSIS: Primary | ICD-10-CM

## 2024-08-06 DIAGNOSIS — N18.6 ESRD (END STAGE RENAL DISEASE): ICD-10-CM

## 2024-08-06 DIAGNOSIS — R42 DIZZINESS: ICD-10-CM

## 2024-08-06 DIAGNOSIS — H93.19 TINNITUS, UNSPECIFIED LATERALITY: ICD-10-CM

## 2024-08-06 LAB
ALBUMIN SERPL BCP-MCNC: 3.8 G/DL (ref 3.5–5.2)
ALP SERPL-CCNC: 169 U/L (ref 55–135)
ALT SERPL W/O P-5'-P-CCNC: 22 U/L (ref 10–44)
ANION GAP SERPL CALC-SCNC: 9 MMOL/L (ref 8–16)
AST SERPL-CCNC: 21 U/L (ref 10–40)
BASOPHILS # BLD AUTO: 0.02 K/UL (ref 0–0.2)
BASOPHILS NFR BLD: 0.2 % (ref 0–1.9)
BILIRUB SERPL-MCNC: 0.5 MG/DL (ref 0.1–1)
BNP SERPL-MCNC: 321 PG/ML (ref 0–99)
BUN SERPL-MCNC: 33 MG/DL (ref 8–23)
CALCIUM SERPL-MCNC: 8.9 MG/DL (ref 8.7–10.5)
CHLORIDE SERPL-SCNC: 106 MMOL/L (ref 95–110)
CO2 SERPL-SCNC: 25 MMOL/L (ref 23–29)
CREAT SERPL-MCNC: 12.6 MG/DL (ref 0.5–1.4)
DIFFERENTIAL METHOD BLD: ABNORMAL
EOSINOPHIL # BLD AUTO: 1.8 K/UL (ref 0–0.5)
EOSINOPHIL NFR BLD: 20.2 % (ref 0–8)
ERYTHROCYTE [DISTWIDTH] IN BLOOD BY AUTOMATED COUNT: 15 % (ref 11.5–14.5)
EST. GFR  (NO RACE VARIABLE): 4 ML/MIN/1.73 M^2
GLUCOSE SERPL-MCNC: 117 MG/DL (ref 70–110)
HCT VFR BLD AUTO: 33 % (ref 40–54)
HGB BLD-MCNC: 10.9 G/DL (ref 14–18)
IMM GRANULOCYTES # BLD AUTO: 0.04 K/UL (ref 0–0.04)
IMM GRANULOCYTES NFR BLD AUTO: 0.5 % (ref 0–0.5)
LIPASE SERPL-CCNC: 144 U/L (ref 4–60)
LYMPHOCYTES # BLD AUTO: 0.9 K/UL (ref 1–4.8)
LYMPHOCYTES NFR BLD: 10.6 % (ref 18–48)
MAGNESIUM SERPL-MCNC: 2.1 MG/DL (ref 1.6–2.6)
MCH RBC QN AUTO: 27.9 PG (ref 27–31)
MCHC RBC AUTO-ENTMCNC: 33 G/DL (ref 32–36)
MCV RBC AUTO: 84 FL (ref 82–98)
MONOCYTES # BLD AUTO: 0.7 K/UL (ref 0.3–1)
MONOCYTES NFR BLD: 8 % (ref 4–15)
NEUTROPHILS # BLD AUTO: 5.3 K/UL (ref 1.8–7.7)
NEUTROPHILS NFR BLD: 60.5 % (ref 38–73)
NRBC BLD-RTO: 0 /100 WBC
PHOSPHATE SERPL-MCNC: 2.9 MG/DL (ref 2.7–4.5)
PLATELET # BLD AUTO: 230 K/UL (ref 150–450)
PMV BLD AUTO: 9.8 FL (ref 9.2–12.9)
POCT GLUCOSE: 125 MG/DL (ref 70–110)
POTASSIUM SERPL-SCNC: 4.5 MMOL/L (ref 3.5–5.1)
PROT SERPL-MCNC: 7.9 G/DL (ref 6–8.4)
RBC # BLD AUTO: 3.91 M/UL (ref 4.6–6.2)
SODIUM SERPL-SCNC: 140 MMOL/L (ref 136–145)
TROPONIN I SERPL DL<=0.01 NG/ML-MCNC: 0.06 NG/ML (ref 0–0.03)
WBC # BLD AUTO: 8.83 K/UL (ref 3.9–12.7)

## 2024-08-06 PROCEDURE — 36415 COLL VENOUS BLD VENIPUNCTURE: CPT | Performed by: NURSE PRACTITIONER

## 2024-08-06 PROCEDURE — 93010 ELECTROCARDIOGRAM REPORT: CPT | Mod: ,,, | Performed by: INTERNAL MEDICINE

## 2024-08-06 PROCEDURE — 85025 COMPLETE CBC W/AUTO DIFF WBC: CPT | Performed by: NURSE PRACTITIONER

## 2024-08-06 PROCEDURE — G0378 HOSPITAL OBSERVATION PER HR: HCPCS

## 2024-08-06 PROCEDURE — 25500020 PHARM REV CODE 255: Performed by: EMERGENCY MEDICINE

## 2024-08-06 PROCEDURE — 93005 ELECTROCARDIOGRAM TRACING: CPT

## 2024-08-06 PROCEDURE — 83880 ASSAY OF NATRIURETIC PEPTIDE: CPT | Performed by: HOSPITALIST

## 2024-08-06 PROCEDURE — 84484 ASSAY OF TROPONIN QUANT: CPT | Performed by: NURSE PRACTITIONER

## 2024-08-06 PROCEDURE — 80053 COMPREHEN METABOLIC PANEL: CPT | Performed by: NURSE PRACTITIONER

## 2024-08-06 PROCEDURE — 25000003 PHARM REV CODE 250: Performed by: EMERGENCY MEDICINE

## 2024-08-06 PROCEDURE — 83735 ASSAY OF MAGNESIUM: CPT | Performed by: NURSE PRACTITIONER

## 2024-08-06 PROCEDURE — 83690 ASSAY OF LIPASE: CPT | Performed by: NURSE PRACTITIONER

## 2024-08-06 PROCEDURE — 84100 ASSAY OF PHOSPHORUS: CPT | Performed by: NURSE PRACTITIONER

## 2024-08-06 RX ORDER — MECLIZINE HYDROCHLORIDE 25 MG/1
25 TABLET ORAL
Status: COMPLETED | OUTPATIENT
Start: 2024-08-06 | End: 2024-08-06

## 2024-08-06 RX ADMIN — IOHEXOL 100 ML: 350 INJECTION, SOLUTION INTRAVENOUS at 08:08

## 2024-08-06 RX ADMIN — MECLIZINE HYDROCHLORIDE 25 MG: 25 TABLET ORAL at 08:08

## 2024-08-07 VITALS
WEIGHT: 194.88 LBS | HEIGHT: 71 IN | HEART RATE: 68 BPM | TEMPERATURE: 98 F | DIASTOLIC BLOOD PRESSURE: 67 MMHG | BODY MASS INDEX: 27.28 KG/M2 | RESPIRATION RATE: 17 BRPM | OXYGEN SATURATION: 99 % | SYSTOLIC BLOOD PRESSURE: 148 MMHG

## 2024-08-07 LAB
ALBUMIN SERPL BCP-MCNC: 3.4 G/DL (ref 3.5–5.2)
ALP SERPL-CCNC: 157 U/L (ref 55–135)
ALT SERPL W/O P-5'-P-CCNC: 18 U/L (ref 10–44)
ANION GAP SERPL CALC-SCNC: 12 MMOL/L (ref 8–16)
AST SERPL-CCNC: 19 U/L (ref 10–40)
BASOPHILS # BLD AUTO: 0.02 K/UL (ref 0–0.2)
BASOPHILS NFR BLD: 0.2 % (ref 0–1.9)
BILIRUB SERPL-MCNC: 0.5 MG/DL (ref 0.1–1)
BUN SERPL-MCNC: 37 MG/DL (ref 8–23)
CALCIUM SERPL-MCNC: 8.4 MG/DL (ref 8.7–10.5)
CHLORIDE SERPL-SCNC: 106 MMOL/L (ref 95–110)
CO2 SERPL-SCNC: 21 MMOL/L (ref 23–29)
CREAT SERPL-MCNC: 13.8 MG/DL (ref 0.5–1.4)
DIFFERENTIAL METHOD BLD: ABNORMAL
EOSINOPHIL # BLD AUTO: 1.8 K/UL (ref 0–0.5)
EOSINOPHIL NFR BLD: 20.9 % (ref 0–8)
ERYTHROCYTE [DISTWIDTH] IN BLOOD BY AUTOMATED COUNT: 15 % (ref 11.5–14.5)
EST. GFR  (NO RACE VARIABLE): 4 ML/MIN/1.73 M^2
GENTAMICIN SERPL-MCNC: 0.8 UG/ML
GLUCOSE SERPL-MCNC: 81 MG/DL (ref 70–110)
HCT VFR BLD AUTO: 30.3 % (ref 40–54)
HGB BLD-MCNC: 10.1 G/DL (ref 14–18)
IMM GRANULOCYTES # BLD AUTO: 0.03 K/UL (ref 0–0.04)
IMM GRANULOCYTES NFR BLD AUTO: 0.3 % (ref 0–0.5)
LYMPHOCYTES # BLD AUTO: 1.1 K/UL (ref 1–4.8)
LYMPHOCYTES NFR BLD: 12.5 % (ref 18–48)
MAGNESIUM SERPL-MCNC: 2.2 MG/DL (ref 1.6–2.6)
MCH RBC QN AUTO: 27.7 PG (ref 27–31)
MCHC RBC AUTO-ENTMCNC: 33.3 G/DL (ref 32–36)
MCV RBC AUTO: 83 FL (ref 82–98)
MONOCYTES # BLD AUTO: 0.7 K/UL (ref 0.3–1)
MONOCYTES NFR BLD: 8.5 % (ref 4–15)
NEUTROPHILS # BLD AUTO: 5 K/UL (ref 1.8–7.7)
NEUTROPHILS NFR BLD: 57.6 % (ref 38–73)
NRBC BLD-RTO: 0 /100 WBC
OHS QRS DURATION: 128 MS
OHS QTC CALCULATION: 441 MS
PHOSPHATE SERPL-MCNC: 3 MG/DL (ref 2.7–4.5)
PLATELET # BLD AUTO: 217 K/UL (ref 150–450)
PMV BLD AUTO: 10.4 FL (ref 9.2–12.9)
POTASSIUM SERPL-SCNC: 4.5 MMOL/L (ref 3.5–5.1)
PROT SERPL-MCNC: 7.2 G/DL (ref 6–8.4)
RBC # BLD AUTO: 3.65 M/UL (ref 4.6–6.2)
SODIUM SERPL-SCNC: 139 MMOL/L (ref 136–145)
VANCOMYCIN SERPL-MCNC: 12.1 UG/ML
WBC # BLD AUTO: 8.75 K/UL (ref 3.9–12.7)

## 2024-08-07 PROCEDURE — 80053 COMPREHEN METABOLIC PANEL: CPT | Performed by: NURSE PRACTITIONER

## 2024-08-07 PROCEDURE — 85025 COMPLETE CBC W/AUTO DIFF WBC: CPT | Performed by: NURSE PRACTITIONER

## 2024-08-07 PROCEDURE — 36415 COLL VENOUS BLD VENIPUNCTURE: CPT | Performed by: NURSE PRACTITIONER

## 2024-08-07 PROCEDURE — 25000003 PHARM REV CODE 250: Performed by: NURSE PRACTITIONER

## 2024-08-07 PROCEDURE — 80170 ASSAY OF GENTAMICIN: CPT | Performed by: NURSE PRACTITIONER

## 2024-08-07 PROCEDURE — 97162 PT EVAL MOD COMPLEX 30 MIN: CPT

## 2024-08-07 PROCEDURE — 83735 ASSAY OF MAGNESIUM: CPT | Performed by: NURSE PRACTITIONER

## 2024-08-07 PROCEDURE — 84100 ASSAY OF PHOSPHORUS: CPT | Performed by: NURSE PRACTITIONER

## 2024-08-07 PROCEDURE — 63600175 PHARM REV CODE 636 W HCPCS: Performed by: NURSE PRACTITIONER

## 2024-08-07 PROCEDURE — 80100014 HC HEMODIALYSIS 1:1

## 2024-08-07 PROCEDURE — 80202 ASSAY OF VANCOMYCIN: CPT | Performed by: NURSE PRACTITIONER

## 2024-08-07 PROCEDURE — G0378 HOSPITAL OBSERVATION PER HR: HCPCS

## 2024-08-07 RX ORDER — ALBUTEROL SULFATE 90 UG/1
2 INHALANT RESPIRATORY (INHALATION) EVERY 4 HOURS PRN
Start: 2024-08-07 | End: 2025-08-07

## 2024-08-07 RX ORDER — IBUPROFEN 200 MG
24 TABLET ORAL
Status: DISCONTINUED | OUTPATIENT
Start: 2024-08-07 | End: 2024-08-07 | Stop reason: HOSPADM

## 2024-08-07 RX ORDER — ATORVASTATIN CALCIUM 20 MG/1
40 TABLET, FILM COATED ORAL DAILY
Status: DISCONTINUED | OUTPATIENT
Start: 2024-08-07 | End: 2024-08-07 | Stop reason: HOSPADM

## 2024-08-07 RX ORDER — ACETAMINOPHEN 325 MG/1
650 TABLET ORAL EVERY 4 HOURS PRN
Status: DISCONTINUED | OUTPATIENT
Start: 2024-08-07 | End: 2024-08-07 | Stop reason: HOSPADM

## 2024-08-07 RX ORDER — ONDANSETRON HYDROCHLORIDE 2 MG/ML
4 INJECTION, SOLUTION INTRAVENOUS EVERY 8 HOURS PRN
Status: DISCONTINUED | OUTPATIENT
Start: 2024-08-07 | End: 2024-08-07 | Stop reason: HOSPADM

## 2024-08-07 RX ORDER — AMLODIPINE BESYLATE 5 MG/1
10 TABLET ORAL DAILY
Status: DISCONTINUED | OUTPATIENT
Start: 2024-08-07 | End: 2024-08-07 | Stop reason: HOSPADM

## 2024-08-07 RX ORDER — NALOXONE HCL 0.4 MG/ML
0.02 VIAL (ML) INJECTION
Status: DISCONTINUED | OUTPATIENT
Start: 2024-08-07 | End: 2024-08-07 | Stop reason: HOSPADM

## 2024-08-07 RX ORDER — CLOPIDOGREL BISULFATE 75 MG/1
75 TABLET ORAL DAILY
Status: DISCONTINUED | OUTPATIENT
Start: 2024-08-07 | End: 2024-08-07 | Stop reason: HOSPADM

## 2024-08-07 RX ORDER — HEPARIN SODIUM 5000 [USP'U]/ML
5000 INJECTION, SOLUTION INTRAVENOUS; SUBCUTANEOUS EVERY 8 HOURS
Status: DISCONTINUED | OUTPATIENT
Start: 2024-08-07 | End: 2024-08-07 | Stop reason: HOSPADM

## 2024-08-07 RX ORDER — CHOLECALCIFEROL (VITAMIN D3) 25 MCG
1000 TABLET ORAL DAILY
Status: DISCONTINUED | OUTPATIENT
Start: 2024-08-07 | End: 2024-08-07 | Stop reason: HOSPADM

## 2024-08-07 RX ORDER — MECLIZINE HYDROCHLORIDE 25 MG/1
25 TABLET ORAL 3 TIMES DAILY PRN
Qty: 30 TABLET | Refills: 0 | Status: SHIPPED | OUTPATIENT
Start: 2024-08-07

## 2024-08-07 RX ORDER — CINACALCET 30 MG/1
60 TABLET, FILM COATED ORAL DAILY
Status: DISCONTINUED | OUTPATIENT
Start: 2024-08-07 | End: 2024-08-07 | Stop reason: HOSPADM

## 2024-08-07 RX ORDER — MECLIZINE HYDROCHLORIDE 25 MG/1
25 TABLET ORAL EVERY 8 HOURS
Status: DISCONTINUED | OUTPATIENT
Start: 2024-08-07 | End: 2024-08-07 | Stop reason: HOSPADM

## 2024-08-07 RX ORDER — LOSARTAN POTASSIUM 50 MG/1
100 TABLET ORAL DAILY
Status: DISCONTINUED | OUTPATIENT
Start: 2024-08-07 | End: 2024-08-07 | Stop reason: HOSPADM

## 2024-08-07 RX ORDER — GLUCAGON 1 MG
1 KIT INJECTION
Status: DISCONTINUED | OUTPATIENT
Start: 2024-08-07 | End: 2024-08-07 | Stop reason: HOSPADM

## 2024-08-07 RX ORDER — SEVELAMER CARBONATE 800 MG/1
800 TABLET, FILM COATED ORAL
Status: DISCONTINUED | OUTPATIENT
Start: 2024-08-07 | End: 2024-08-07 | Stop reason: HOSPADM

## 2024-08-07 RX ORDER — SODIUM CHLORIDE 9 MG/ML
INJECTION, SOLUTION INTRAVENOUS ONCE
Status: CANCELLED | OUTPATIENT
Start: 2024-08-07 | End: 2024-08-07

## 2024-08-07 RX ORDER — SODIUM CHLORIDE 0.9 % (FLUSH) 0.9 %
10 SYRINGE (ML) INJECTION EVERY 8 HOURS PRN
Status: DISCONTINUED | OUTPATIENT
Start: 2024-08-07 | End: 2024-08-07 | Stop reason: HOSPADM

## 2024-08-07 RX ORDER — LATANOPROST 50 UG/ML
1 SOLUTION/ DROPS OPHTHALMIC NIGHTLY
Status: DISCONTINUED | OUTPATIENT
Start: 2024-08-07 | End: 2024-08-07 | Stop reason: HOSPADM

## 2024-08-07 RX ORDER — IBUPROFEN 200 MG
16 TABLET ORAL
Status: DISCONTINUED | OUTPATIENT
Start: 2024-08-07 | End: 2024-08-07 | Stop reason: HOSPADM

## 2024-08-07 RX ORDER — MUPIROCIN 20 MG/G
OINTMENT TOPICAL 2 TIMES DAILY
Status: CANCELLED | OUTPATIENT
Start: 2024-08-07 | End: 2024-08-12

## 2024-08-07 RX ADMIN — SEVELAMER CARBONATE 800 MG: 800 TABLET, FILM COATED ORAL at 01:08

## 2024-08-07 RX ADMIN — MECLIZINE HYDROCHLORIDE 25 MG: 25 TABLET ORAL at 01:08

## 2024-08-07 RX ADMIN — AMLODIPINE BESYLATE 10 MG: 5 TABLET ORAL at 01:08

## 2024-08-07 RX ADMIN — LATANOPROST 1 DROP: 50 SOLUTION OPHTHALMIC at 01:08

## 2024-08-07 RX ADMIN — CLOPIDOGREL BISULFATE 75 MG: 75 TABLET ORAL at 01:08

## 2024-08-07 RX ADMIN — Medication 1000 UNITS: at 01:08

## 2024-08-07 RX ADMIN — ASCORBIC ACID, THIAMINE MONONITRATE,RIBOFLAVIN, NIACINAMIDE, PYRIDOXINE HYDROCHLORIDE, FOLIC ACID, CYANOCOBALAMIN, BIOTIN, CALCIUM PANTOTHENATE, 1 CAPSULE: 100; 1.5; 1.7; 20; 10; 1; 6000; 150000; 5 CAPSULE, LIQUID FILLED ORAL at 01:08

## 2024-08-07 RX ADMIN — CINACALCET 60 MG: 30 TABLET, FILM COATED ORAL at 01:08

## 2024-08-08 ENCOUNTER — TELEPHONE (OUTPATIENT)
Dept: OTOLARYNGOLOGY | Facility: CLINIC | Age: 64
End: 2024-08-08
Payer: MEDICARE

## 2024-08-12 NOTE — PROGRESS NOTES
Subjective:       Patient ID: Glenn Collier is a 63 y.o. male.    Chief Complaint: No chief complaint on file.      The patient is referred by Temi White DNP, for evaluation of vertigo and chronic imbalance.  On July 11, 2024 the patient underwent surgery for vascular access in his right lower extremity.  He developed fever 4 days later.  He was started on IV vancomycin and gentamicin and took 3 doses on the 15th, 17th and 19th of July.  On July 22nd he developed an allergic reaction where he developed hives ataxia, vertigo with nausea and vomiting that lasted for about 24 hours.  He has had persistent imbalance and ataxia since.  When he walks he veers from both the right to the left.  He now has to use a cane when ambulating because of the unsteadiness.  He was treated in the emergency room for the allergic reaction.  He was given meclizine.  He took 1 dose and it was mildly helpful.  His taken none since.      The patient does have chronic kidney failure in his on dialysis.          Review of Systems     Constitutional: Positive for fatigue and fever.      Genitourinary: History of kidney cancer    Skin: Positive for rash.     Neurological: Positive for dizziness and light-headedness. History of CVA    Hematologic: Positive for bruises/bleeds easily. Anemia              Objective:      Physical Exam  Vitals and nursing note reviewed.   Constitutional:       General: He is awake.      Appearance: Normal appearance. He is well-developed, well-groomed and normal weight.   HENT:      Head: Normocephalic.      Jaw: There is normal jaw occlusion.      Salivary Glands: Right salivary gland is not diffusely enlarged or tender. Left salivary gland is not diffusely enlarged or tender.      Right Ear: Hearing, ear canal and external ear normal. Tympanic membrane is retracted. Tympanic membrane has normal mobility.      Left Ear: Hearing, ear canal and external ear normal. Tympanic membrane is retracted. Tympanic  membrane has normal mobility.      Nose: Septal deviation (to the right), mucosal edema (cyanotic, boggy inferior turbinates bilaterally) and rhinorrhea (clear mucus bilaterally) present. No nasal deformity. Rhinorrhea is clear.      Right Turbinates: Enlarged and pale.      Left Turbinates: Enlarged and pale.      Mouth/Throat:      Lips: No lesions.      Mouth: Mucous membranes are dry. No oral lesions.      Dentition: Abnormal dentition (edentulous). Has dentures (full upper and lower denture). No gum lesions.      Tongue: No lesions.      Palate: No mass and lesions.      Pharynx: Oropharynx is clear. Uvula midline.   Eyes:      General: Lids are normal. Vision grossly intact.         Right eye: No discharge.         Left eye: No discharge.      Extraocular Movements: Extraocular movements intact.      Pupils: Pupils are equal, round, and reactive to light.      Comments: Conjunctivae-pale   Neck:      Thyroid: No thyroid mass or thyromegaly.      Vascular: No carotid bruit.      Trachea: Trachea normal. No tracheal deviation.   Cardiovascular:      Rate and Rhythm: Normal rate and regular rhythm.      Pulses: Normal pulses.      Heart sounds: Normal heart sounds.      Comments: AV shunt in right upper and lower extremities  Pulmonary:      Effort: Pulmonary effort is normal.      Breath sounds: Normal breath sounds. No stridor. No decreased breath sounds, wheezing, rhonchi or rales.   Abdominal:      General: Bowel sounds are normal.      Palpations: Abdomen is soft.      Tenderness: There is no abdominal tenderness.   Musculoskeletal:         General: Normal range of motion.      Cervical back: Normal range of motion. No muscular tenderness.   Lymphadenopathy:      Head:      Right side of head: No submental, submandibular, preauricular, posterior auricular or occipital adenopathy.      Left side of head: No submental, submandibular, preauricular, posterior auricular or occipital adenopathy.      Cervical: No  cervical adenopathy.   Skin:     General: Skin is warm and dry.      Findings: No petechiae or rash.      Nails: There is no clubbing.   Neurological:      Mental Status: He is alert and oriented to person, place, and time.      Cranial Nerves: No cranial nerve deficit.      Sensory: No sensory deficit.      Gait: Gait normal.   Psychiatric:         Speech: Speech normal.         Behavior: Behavior normal. Behavior is cooperative.         Thought Content: Thought content normal.         Judgment: Judgment normal.           I personally reviewed the above audiogram and discussed in full detail with the patient in his wife.  I provided him with a copy of the audiogram.  Pertinent findings:  Mild-to-moderate sensorineural hearing loss with normal auditory discrimination at mildly elevated speech levels and normal impedance audiometry.    Assessment:       1. Disorder of acid-base balance    2. Ataxia    3. Tinnitus of both ears    4. Sensorineural hearing loss of both ears    5. Nasal septal deviation        Plan:       I will schedule patient for VNG and recheck him when I have those results.                        DISCLAIMER: This note was prepared with [employer] voice recognition transcription software. Garbled syntax, mangled pronouns, and other bizarre constructions may be attributed to that software system. While efforts were made to correct any mistakes made by this voice recognition program, some errors and/or omissions may remain in the note that were missed when the note was originally created.

## 2024-08-14 NOTE — PROGRESS NOTES
Mr. Glenn Collier was seen in the clinic today for an audiological evaluation.  Mr. Collier reported that he has been experiencing vertigo for the past 2 weeks.  He denied hearing loss.    Audiological testing revealed normal hearing sloping to a mild to moderately-severe mid to high frequency sensorineural hearing loss for the right ear and normal hearing sloping to a moderate to moderately-severe high frequency sensorineural hearing loss for the left ear.  A speech reception threshold was obtained at 15 dBHL for the right ear and at 15 dBHL for the left ear.  Speech discrimination was 92% for the right ear and 96% for the left ear.      Tympanometry testing revealed a Type A tympanogram for the right ear and a Type A tympanogram for the left ear.      Recommendations:  1. Otologic evaluation  2. Annual audiological evaluation  3. Hearing protection when in noise   4. Hearing aid consultation following medical clearance

## 2024-08-15 ENCOUNTER — OFFICE VISIT (OUTPATIENT)
Dept: SURGERY | Facility: CLINIC | Age: 64
End: 2024-08-15
Attending: SPECIALIST
Payer: MEDICARE

## 2024-08-15 ENCOUNTER — CLINICAL SUPPORT (OUTPATIENT)
Dept: OTOLARYNGOLOGY | Facility: CLINIC | Age: 64
End: 2024-08-15
Payer: MEDICARE

## 2024-08-15 ENCOUNTER — OFFICE VISIT (OUTPATIENT)
Dept: OTOLARYNGOLOGY | Facility: CLINIC | Age: 64
End: 2024-08-15
Payer: MEDICARE

## 2024-08-15 VITALS
SYSTOLIC BLOOD PRESSURE: 181 MMHG | BODY MASS INDEX: 27.1 KG/M2 | DIASTOLIC BLOOD PRESSURE: 83 MMHG | HEART RATE: 67 BPM | WEIGHT: 194.31 LBS | OXYGEN SATURATION: 100 %

## 2024-08-15 VITALS — SYSTOLIC BLOOD PRESSURE: 160 MMHG | HEART RATE: 65 BPM | OXYGEN SATURATION: 100 % | DIASTOLIC BLOOD PRESSURE: 78 MMHG

## 2024-08-15 DIAGNOSIS — H93.19 TINNITUS, UNSPECIFIED LATERALITY: ICD-10-CM

## 2024-08-15 DIAGNOSIS — H90.3 SENSORINEURAL HEARING LOSS OF BOTH EARS: ICD-10-CM

## 2024-08-15 DIAGNOSIS — R42 DIZZINESS: ICD-10-CM

## 2024-08-15 DIAGNOSIS — H93.13 TINNITUS OF BOTH EARS: ICD-10-CM

## 2024-08-15 DIAGNOSIS — R27.0 ATAXIA: ICD-10-CM

## 2024-08-15 DIAGNOSIS — J34.2 NASAL SEPTAL DEVIATION: ICD-10-CM

## 2024-08-15 DIAGNOSIS — H81.8X9 OTHER DISORDERS OF VESTIBULAR FUNCTION, UNSPECIFIED EAR: Primary | ICD-10-CM

## 2024-08-15 DIAGNOSIS — E87.8: Primary | ICD-10-CM

## 2024-08-15 DIAGNOSIS — T82.599A MECHANICAL COMPLICATION OF VASCULAR DEVICE, INITIAL ENCOUNTER: Primary | ICD-10-CM

## 2024-08-15 PROCEDURE — 99214 OFFICE O/P EST MOD 30 MIN: CPT | Mod: PBBFAC,27,PN,25 | Performed by: SPECIALIST

## 2024-08-15 PROCEDURE — 99999 PR PBB SHADOW E&M-EST. PATIENT-LVL IV: CPT | Mod: PBBFAC,,, | Performed by: SPECIALIST

## 2024-08-15 PROCEDURE — 99999 PR PBB SHADOW E&M-EST. PATIENT-LVL III: CPT | Mod: PBBFAC,,, | Performed by: SPECIALIST

## 2024-08-15 PROCEDURE — 99204 OFFICE O/P NEW MOD 45 MIN: CPT | Mod: S$PBB,,, | Performed by: SPECIALIST

## 2024-08-15 PROCEDURE — 92567 TYMPANOMETRY: CPT | Mod: PBBFAC,PN | Performed by: AUDIOLOGIST

## 2024-08-15 PROCEDURE — 92557 COMPREHENSIVE HEARING TEST: CPT | Mod: PBBFAC,PN | Performed by: AUDIOLOGIST

## 2024-08-15 PROCEDURE — 99213 OFFICE O/P EST LOW 20 MIN: CPT | Mod: PBBFAC | Performed by: SPECIALIST

## 2024-08-15 PROCEDURE — 99024 POSTOP FOLLOW-UP VISIT: CPT | Mod: POP,,, | Performed by: SPECIALIST

## 2024-08-15 NOTE — PROGRESS NOTES
63-year-old male with end-stage renal disease on longstanding hemodialysis   Patient with functional SVC occlusion   Patient has been dialyzed through right thigh loop graft for several years   On 07/11/2024 patient had replacement of medial limb of graft and excision of aneurysmal segment of AV fistula   Patient placed on prophylactic gentle/vanc during dialysis   Developed urticaria after recent dosage and antibiotic stopped       Subjective   No complaints referable to surgical incision or graft    PE   Wounds healed, trace perigraft edema, minimal tenderness, no erythema or fluctuance    Impression/plan   Surgically stable   RTC 2 months

## 2024-09-16 ENCOUNTER — EXTERNAL HOME HEALTH (OUTPATIENT)
Dept: HOME HEALTH SERVICES | Facility: HOSPITAL | Age: 64
End: 2024-09-16
Payer: MEDICARE

## 2024-10-22 ENCOUNTER — OFFICE VISIT (OUTPATIENT)
Dept: CARDIOLOGY | Facility: CLINIC | Age: 64
End: 2024-10-22
Attending: INTERNAL MEDICINE
Payer: MEDICARE

## 2024-10-22 ENCOUNTER — OFFICE VISIT (OUTPATIENT)
Dept: SURGERY | Facility: CLINIC | Age: 64
End: 2024-10-22
Attending: SPECIALIST
Payer: MEDICARE

## 2024-10-22 VITALS
WEIGHT: 194 LBS | SYSTOLIC BLOOD PRESSURE: 156 MMHG | HEART RATE: 65 BPM | DIASTOLIC BLOOD PRESSURE: 77 MMHG | OXYGEN SATURATION: 100 % | BODY MASS INDEX: 27.16 KG/M2 | HEIGHT: 71 IN

## 2024-10-22 VITALS
SYSTOLIC BLOOD PRESSURE: 144 MMHG | OXYGEN SATURATION: 100 % | DIASTOLIC BLOOD PRESSURE: 64 MMHG | HEIGHT: 71 IN | BODY MASS INDEX: 27.4 KG/M2 | WEIGHT: 195.75 LBS | HEART RATE: 60 BPM

## 2024-10-22 DIAGNOSIS — E78.00 HYPERCHOLESTEROLEMIA: ICD-10-CM

## 2024-10-22 DIAGNOSIS — Z99.2 ANEMIA IN CHRONIC KIDNEY DISEASE, ON CHRONIC DIALYSIS: ICD-10-CM

## 2024-10-22 DIAGNOSIS — E66.3 OVERWEIGHT: ICD-10-CM

## 2024-10-22 DIAGNOSIS — T82.599A MECHANICAL COMPLICATION OF VASCULAR DEVICE, INITIAL ENCOUNTER: Primary | ICD-10-CM

## 2024-10-22 DIAGNOSIS — Z99.2 CHRONIC KIDNEY DISEASE WITH END STAGE RENAL FAILURE ON DIALYSIS: ICD-10-CM

## 2024-10-22 DIAGNOSIS — D63.1 ANEMIA IN CHRONIC KIDNEY DISEASE, ON CHRONIC DIALYSIS: ICD-10-CM

## 2024-10-22 DIAGNOSIS — Z86.73 HISTORY OF CEREBROVASCULAR ACCIDENT: ICD-10-CM

## 2024-10-22 DIAGNOSIS — N18.6 CHRONIC KIDNEY DISEASE WITH END STAGE RENAL FAILURE ON DIALYSIS: ICD-10-CM

## 2024-10-22 DIAGNOSIS — I15.0 RENOVASCULAR HYPERTENSION: ICD-10-CM

## 2024-10-22 DIAGNOSIS — N18.6 ANEMIA IN CHRONIC KIDNEY DISEASE, ON CHRONIC DIALYSIS: ICD-10-CM

## 2024-10-22 PROCEDURE — 99999 PR PBB SHADOW E&M-EST. PATIENT-LVL IV: CPT | Mod: PBBFAC,,, | Performed by: SPECIALIST

## 2024-10-22 PROCEDURE — 99212 OFFICE O/P EST SF 10 MIN: CPT | Mod: S$PBB,,, | Performed by: SPECIALIST

## 2024-10-22 PROCEDURE — 99213 OFFICE O/P EST LOW 20 MIN: CPT | Mod: PBBFAC | Performed by: INTERNAL MEDICINE

## 2024-10-22 PROCEDURE — 99999 PR PBB SHADOW E&M-EST. PATIENT-LVL III: CPT | Mod: PBBFAC,,, | Performed by: INTERNAL MEDICINE

## 2024-10-22 PROCEDURE — 99214 OFFICE O/P EST MOD 30 MIN: CPT | Mod: PBBFAC,27 | Performed by: SPECIALIST

## 2024-10-22 RX ORDER — CLOPIDOGREL BISULFATE 75 MG/1
75 TABLET ORAL DAILY
Qty: 90 TABLET | Refills: 3 | Status: SHIPPED | OUTPATIENT
Start: 2024-10-22

## 2024-10-22 RX ORDER — LOSARTAN POTASSIUM 100 MG/1
100 TABLET ORAL DAILY
Qty: 90 TABLET | Refills: 3 | Status: SHIPPED | OUTPATIENT
Start: 2024-10-22

## 2024-10-22 RX ORDER — AMLODIPINE BESYLATE 10 MG/1
10 TABLET ORAL DAILY
Qty: 90 TABLET | Refills: 3 | Status: SHIPPED | OUTPATIENT
Start: 2024-10-22

## 2024-10-22 RX ORDER — ATORVASTATIN CALCIUM 40 MG/1
40 TABLET, FILM COATED ORAL DAILY
Qty: 90 TABLET | Refills: 3 | Status: SHIPPED | OUTPATIENT
Start: 2024-10-22

## 2024-10-22 NOTE — PROGRESS NOTES
Subjective:     Glenn Collier is a 63 y.o. male with hypertension and hypercholesterolemia. He is overweight. He developed end stage renal disease due to hypertension in 1997 and began hemodialysis. Access is from a graft in the right thigh. He has undergone bilateral nephrectomies. He had a large pericardial effusion in 2006 that resolved with intensified dialysis. He receives erythropoietin shots about every two weeks. 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 He once had a very light stroke. He denies any exertional chest pain or exertional dyspnea. He denies any claudication. No palpitations or weak spells. No bleeding. No issues with any of his prescribed medications. Feeling well overall.      Hypertension  This is a chronic problem. The current episode started more than 1 year ago. The problem is unchanged. The problem is controlled (usually 120-130/60-80 mmHg at home). Pertinent negatives include no anxiety, blurred vision, chest pain, headaches, malaise/fatigue, neck pain, orthopnea, palpitations, peripheral edema, PND, shortness of breath or sweats. Identifiable causes of hypertension include chronic renal disease.   Hyperlipidemia  This is a chronic problem. The current episode started more than 1 year ago. The problem is controlled. Recent lipid tests were reviewed and are normal. Exacerbating diseases include chronic renal disease. He has no history of diabetes, hypothyroidism, liver disease, obesity or nephrotic syndrome. Pertinent negatives include no chest pain, focal sensory loss, focal weakness, leg pain, myalgias or shortness of breath.   Cerebrovascular Accident  Pertinent negatives include no chest pain, chills, coughing, fever, headaches, myalgias, nausea, neck pain, numbness, rash, vertigo, vomiting or weakness.       Review of Systems   Constitutional: Negative for chills, fever and malaise/fatigue.   HENT:  Negative for nosebleeds.     Eyes:  Negative for blurred vision, double vision, vision loss in left eye and vision loss in right eye.   Cardiovascular:  Negative for chest pain, claudication, dyspnea on exertion, irregular heartbeat, leg swelling, near-syncope, orthopnea, palpitations, paroxysmal nocturnal dyspnea and syncope.   Respiratory:  Negative for cough, hemoptysis, shortness of breath and wheezing.    Endocrine: Negative for cold intolerance and heat intolerance.   Hematologic/Lymphatic: Negative for bleeding problem. Does not bruise/bleed easily.   Skin:  Negative for color change and rash.   Musculoskeletal:  Negative for back pain, falls, muscle weakness, myalgias and neck pain.   Gastrointestinal:  Negative for heartburn, hematemesis, hematochezia, hemorrhoids, jaundice, melena, nausea and vomiting.   Neurological:  Negative for dizziness, focal weakness, headaches, light-headedness, loss of balance, numbness, tremors, vertigo and weakness.   Psychiatric/Behavioral:  Negative for altered mental status, depression and memory loss. The patient is not nervous/anxious.    Allergic/Immunologic: Negative for hives and persistent infections.       Current Outpatient Medications on File Prior to Visit   Medication Sig Dispense Refill    albuterol (VENTOLIN HFA) 90 mcg/actuation inhaler Inhale 2 puffs into the lungs every 4 (four) hours as needed for Wheezing or Shortness of Breath. Rescue      amLODIPine (NORVASC) 10 MG tablet Take 1 tablet (10 mg total) by mouth once daily. 90 tablet 3    atorvastatin (LIPITOR) 40 MG tablet Take 1 tablet (40 mg total) by mouth once daily. 90 tablet 3    cinacalcet (SENSIPAR) 60 MG Tab Take 60 mg by mouth once daily. Take 60 mg daily at bedtime      clopidogreL (PLAVIX) 75 mg tablet Take 1 tablet (75 mg total) by mouth once daily. 90 tablet 3    latanoprost 0.005 % ophthalmic solution 1 drop every evening.      losartan (COZAAR) 100 MG tablet Take 1 tablet (100 mg total) by mouth once daily. 90 tablet 3  "   sevelamer carbonate (RENVELA) 800 mg Tab Take 800 mg by mouth 3 (three) times daily with meals.      tadalafil (CIALIS) 5 MG tablet Take 5 mg by mouth daily as needed for Erectile Dysfunction.      VIRT-CAPS 1 mg Cap Take 1 capsule by mouth.      vitamin D (VITAMIN D3) 1000 units Tab Take 1,000 Units by mouth once daily. Take 1 capsule daily.      B COMPLEX & C NO.20/FOLIC ACID (NEPHROCAPS ORAL) Take 1 capsule by mouth once daily. (Patient not taking: Reported on 10/22/2024)      meclizine (ANTIVERT) 25 mg tablet Take 1 tablet (25 mg total) by mouth 3 (three) times daily as needed for Dizziness. (Patient not taking: Reported on 10/22/2024) 30 tablet 0     Current Facility-Administered Medications on File Prior to Visit   Medication Dose Route Frequency Provider Last Rate Last Admin    heparin (porcine) injection  500 Units/hr Intravenous Continuous Bodana, Shirisha, DO 0.5 mL/hr at 03/15/24 0602 500 Units/hr at 03/15/24 0602    [DISCONTINUED] epoetin gabrielle injection 6,000 Units  6,000 Units Subcutaneous Every Mon Bodana, Shirisha, DO   6,000 Units at 10/21/24 0900       BP (!) 144/64   Pulse 60   Ht 5' 11" (1.803 m)   Wt 88.8 kg (195 lb 12.3 oz)   SpO2 100%   BMI 27.30 kg/m²      Objective:     Physical Exam  Constitutional:       General: He is not in acute distress.     Appearance: Normal appearance. He is well-developed. He is not ill-appearing, toxic-appearing or diaphoretic.   HENT:      Head: Normocephalic and atraumatic.      Nose: Nose normal.   Eyes:      General:         Right eye: No discharge.         Left eye: No discharge.      Conjunctiva/sclera:      Right eye: Right conjunctiva is not injected.      Left eye: Left conjunctiva is not injected.      Pupils: Pupils are equal.      Right eye: Pupil is round.      Left eye: Pupil is round.   Neck:      Thyroid: No thyromegaly.      Vascular: No carotid bruit or JVD.   Cardiovascular:      Rate and Rhythm: Normal rate and regular rhythm. No " extrasystoles are present.     Chest Wall: PMI is not displaced.      Pulses:           Radial pulses are 2+ on the right side and 2+ on the left side.        Femoral pulses are 2+ on the right side and 2+ on the left side.       Dorsalis pedis pulses are 1+ on the right side and 1+ on the left side.        Posterior tibial pulses are 1+ on the right side and 1+ on the left side.      Heart sounds: S1 normal and S2 normal. Murmur heard.      Harsh midsystolic murmur is present at the upper right sternal border. Graft right upper thigh.  Multiple fistulas and scars right arm.  No edema of arms.      Gallop present. S4 sounds present.   Pulmonary:      Effort: Pulmonary effort is normal.      Breath sounds: Normal breath sounds.   Abdominal:      Palpations: Abdomen is soft.      Tenderness: There is no abdominal tenderness.   Musculoskeletal:      Cervical back: Neck supple.      Right ankle: No swelling, deformity or ecchymosis.      Left ankle: No swelling, deformity or ecchymosis.   Lymphadenopathy:      Head:      Right side of head: No submandibular adenopathy.      Left side of head: No submandibular adenopathy.      Cervical: No cervical adenopathy.   Skin:     General: Skin is warm and dry.      Findings: No rash.      Nails: There is no clubbing.   Neurological:      General: No focal deficit present.      Mental Status: He is alert and oriented to person, place, and time. He is not disoriented.      Cranial Nerves: No cranial nerve deficit.      Sensory: No sensory deficit.   Psychiatric:         Attention and Perception: Attention and perception normal.         Mood and Affect: Mood and affect normal.         Speech: Speech normal.         Behavior: Behavior normal.         Thought Content: Thought content normal.         Cognition and Memory: Cognition and memory normal.         Judgment: Judgment normal.       Assessment:     1. History of cerebrovascular accident    2. Renovascular hypertension    3.  Hypercholesterolemia    4. Overweight    5. Chronic kidney disease with end stage renal failure on dialysis    6. Anemia in chronic kidney disease, on chronic dialysis        Plan:     History of Cerebrovascular Accident   Tells me once was told he had a light stroke. No sequela.   4/19/2023: Aspirin 81 mg Q24 was discontinued.   On clopidogrel 75 mg Q24.   No bleeding.     2. Hypertension   1997: Diagnosed with severe hypertension.   On amlodipine 10 mg Q24 and losartan 100 mg Q24.   Keeping log at home.   Appears well controlled.    3. Hypercholesterolemia   1985: Began statin.   On atorvastatin 20 mg Q24.   6/17/2016: Chol 109. HDL 31. LDL 43. .   2/20/2023: Chol 122. HDL 49. LDL 46. .   On atorvastatin 40 mg Q24.   Very favorable lipid panel.    4. Overweight   4/19/2021: Weight 93 kg. BMI 29.   10/22/2024: Weight 89 kg. BMI 27.   Near optimal weight.    5. End Stage Kidney Disease on Dialysis   9/18/1997: Began dialysis. [facility]   Dr. Steve Garg.     6. Anemia   Due to end stage renal disease and nephrectomies.   Receiving erythropoietin injections about every two weeks.    7. Primary Care   Dr. Steve Garg.    F/u 6 months.    Manuel Vides M.D.

## 2024-10-22 NOTE — PROGRESS NOTES
Status post revision of loop [place] right thigh 07/11/2024   Medial limb of fistula were placed    Subjective   No complaint  Fistula functioning without issue in dialysis    PE   Wounds healed   Positive thrill    Impression/plan   Fistula working without issue   RTC p.r.n.

## 2024-10-28 ENCOUNTER — EXTERNAL HOME HEALTH (OUTPATIENT)
Dept: HOME HEALTH SERVICES | Facility: HOSPITAL | Age: 64
End: 2024-10-28
Payer: MEDICARE

## 2025-03-07 ENCOUNTER — HOSPITAL ENCOUNTER (OUTPATIENT)
Facility: HOSPITAL | Age: 65
Discharge: HOME | End: 2025-03-07
Attending: EMERGENCY MEDICINE | Admitting: EMERGENCY MEDICINE
Payer: MEDICARE

## 2025-03-07 VITALS
RESPIRATION RATE: 16 BRPM | SYSTOLIC BLOOD PRESSURE: 173 MMHG | HEART RATE: 59 BPM | WEIGHT: 195 LBS | BODY MASS INDEX: 27.3 KG/M2 | TEMPERATURE: 98 F | DIASTOLIC BLOOD PRESSURE: 83 MMHG | OXYGEN SATURATION: 100 % | HEIGHT: 71 IN

## 2025-03-07 DIAGNOSIS — I15.0 RENOVASCULAR HYPERTENSION: ICD-10-CM

## 2025-03-07 DIAGNOSIS — R07.9 CHEST PAIN: ICD-10-CM

## 2025-03-07 DIAGNOSIS — E87.5 HYPERKALEMIA: ICD-10-CM

## 2025-03-07 DIAGNOSIS — Z78.9 PROBLEM WITH VASCULAR ACCESS: Primary | ICD-10-CM

## 2025-03-07 LAB
ALBUMIN SERPL BCP-MCNC: 4.3 G/DL (ref 3.5–5.2)
ALP SERPL-CCNC: 214 U/L (ref 40–150)
ALT SERPL W/O P-5'-P-CCNC: 21 U/L (ref 10–44)
ANION GAP SERPL CALC-SCNC: 12 MMOL/L (ref 8–16)
AST SERPL-CCNC: 28 U/L (ref 10–40)
BASOPHILS # BLD AUTO: 0.02 K/UL (ref 0–0.2)
BASOPHILS NFR BLD: 0.3 % (ref 0–1.9)
BILIRUB SERPL-MCNC: 0.6 MG/DL (ref 0.1–1)
BUN SERPL-MCNC: 50 MG/DL (ref 8–23)
CALCIUM SERPL-MCNC: 8.8 MG/DL (ref 8.7–10.5)
CHLORIDE SERPL-SCNC: 106 MMOL/L (ref 95–110)
CO2 SERPL-SCNC: 21 MMOL/L (ref 23–29)
CREAT SERPL-MCNC: 12.4 MG/DL (ref 0.5–1.4)
DIFFERENTIAL METHOD BLD: ABNORMAL
EOSINOPHIL # BLD AUTO: 0.4 K/UL (ref 0–0.5)
EOSINOPHIL NFR BLD: 5.3 % (ref 0–8)
ERYTHROCYTE [DISTWIDTH] IN BLOOD BY AUTOMATED COUNT: 14.4 % (ref 11.5–14.5)
EST. GFR  (NO RACE VARIABLE): 4.1 ML/MIN/1.73 M^2
GLUCOSE SERPL-MCNC: 86 MG/DL (ref 70–110)
HCT VFR BLD AUTO: 35.1 % (ref 40–54)
HGB BLD-MCNC: 11.4 G/DL (ref 14–18)
IMM GRANULOCYTES # BLD AUTO: 0.08 K/UL (ref 0–0.04)
IMM GRANULOCYTES NFR BLD AUTO: 1 % (ref 0–0.5)
LYMPHOCYTES # BLD AUTO: 1.2 K/UL (ref 1–4.8)
LYMPHOCYTES NFR BLD: 14.5 % (ref 18–48)
MCH RBC QN AUTO: 28.9 PG (ref 27–31)
MCHC RBC AUTO-ENTMCNC: 32.5 G/DL (ref 32–36)
MCV RBC AUTO: 89 FL (ref 82–98)
MONOCYTES # BLD AUTO: 0.8 K/UL (ref 0.3–1)
MONOCYTES NFR BLD: 9.7 % (ref 4–15)
NEUTROPHILS # BLD AUTO: 5.5 K/UL (ref 1.8–7.7)
NEUTROPHILS NFR BLD: 69.2 % (ref 38–73)
NRBC BLD-RTO: 0 /100 WBC
OHS QRS DURATION: 118 MS
OHS QTC CALCULATION: 417 MS
PLATELET # BLD AUTO: 207 K/UL (ref 150–450)
PMV BLD AUTO: 10.9 FL (ref 9.2–12.9)
POCT GLUCOSE: 395 MG/DL (ref 70–110)
POCT GLUCOSE: 93 MG/DL (ref 70–110)
POTASSIUM SERPL-SCNC: 5.5 MMOL/L (ref 3.5–5.1)
PROT SERPL-MCNC: 8.2 G/DL (ref 6–8.4)
RBC # BLD AUTO: 3.94 M/UL (ref 4.6–6.2)
SODIUM SERPL-SCNC: 139 MMOL/L (ref 136–145)
WBC # BLD AUTO: 7.94 K/UL (ref 3.9–12.7)

## 2025-03-07 PROCEDURE — 63600175 PHARM REV CODE 636 W HCPCS: Performed by: EMERGENCY MEDICINE

## 2025-03-07 PROCEDURE — 99153 MOD SED SAME PHYS/QHP EA: CPT | Performed by: SURGERY

## 2025-03-07 PROCEDURE — 36905 THRMBC/NFS DIALYSIS CIRCUIT: CPT | Mod: GC,,, | Performed by: SURGERY

## 2025-03-07 PROCEDURE — 27201423 OPTIME MED/SURG SUP & DEVICES STERILE SUPPLY: Performed by: SURGERY

## 2025-03-07 PROCEDURE — 99900035 HC TECH TIME PER 15 MIN (STAT)

## 2025-03-07 PROCEDURE — C1757 CATH, THROMBECTOMY/EMBOLECT: HCPCS | Performed by: SURGERY

## 2025-03-07 PROCEDURE — 94761 N-INVAS EAR/PLS OXIMETRY MLT: CPT

## 2025-03-07 PROCEDURE — 80053 COMPREHEN METABOLIC PANEL: CPT | Performed by: EMERGENCY MEDICINE

## 2025-03-07 PROCEDURE — G0378 HOSPITAL OBSERVATION PER HR: HCPCS

## 2025-03-07 PROCEDURE — 99214 OFFICE O/P EST MOD 30 MIN: CPT | Mod: ,,,

## 2025-03-07 PROCEDURE — 25500020 PHARM REV CODE 255: Performed by: SURGERY

## 2025-03-07 PROCEDURE — 99285 EMERGENCY DEPT VISIT HI MDM: CPT | Mod: 25

## 2025-03-07 PROCEDURE — 93005 ELECTROCARDIOGRAM TRACING: CPT

## 2025-03-07 PROCEDURE — C1769 GUIDE WIRE: HCPCS | Performed by: SURGERY

## 2025-03-07 PROCEDURE — 94640 AIRWAY INHALATION TREATMENT: CPT

## 2025-03-07 PROCEDURE — 63600175 PHARM REV CODE 636 W HCPCS: Performed by: SURGERY

## 2025-03-07 PROCEDURE — 99285 EMERGENCY DEPT VISIT HI MDM: CPT | Mod: 25,GC,, | Performed by: SURGERY

## 2025-03-07 PROCEDURE — 93010 ELECTROCARDIOGRAM REPORT: CPT | Mod: ,,, | Performed by: INTERNAL MEDICINE

## 2025-03-07 PROCEDURE — 99152 MOD SED SAME PHYS/QHP 5/>YRS: CPT | Performed by: SURGERY

## 2025-03-07 PROCEDURE — C1894 INTRO/SHEATH, NON-LASER: HCPCS | Performed by: SURGERY

## 2025-03-07 PROCEDURE — 36905 THRMBC/NFS DIALYSIS CIRCUIT: CPT | Performed by: SURGERY

## 2025-03-07 PROCEDURE — 99152 MOD SED SAME PHYS/QHP 5/>YRS: CPT | Mod: ,,, | Performed by: SURGERY

## 2025-03-07 PROCEDURE — G0257 UNSCHED DIALYSIS ESRD PT HOS: HCPCS

## 2025-03-07 PROCEDURE — 25000242 PHARM REV CODE 250 ALT 637 W/ HCPCS: Performed by: EMERGENCY MEDICINE

## 2025-03-07 PROCEDURE — 85025 COMPLETE CBC W/AUTO DIFF WBC: CPT | Performed by: EMERGENCY MEDICINE

## 2025-03-07 PROCEDURE — C1725 CATH, TRANSLUMIN NON-LASER: HCPCS | Performed by: SURGERY

## 2025-03-07 PROCEDURE — 25000003 PHARM REV CODE 250: Performed by: PHYSICIAN ASSISTANT

## 2025-03-07 PROCEDURE — 25000003 PHARM REV CODE 250: Performed by: EMERGENCY MEDICINE

## 2025-03-07 RX ORDER — FENTANYL CITRATE 50 UG/ML
INJECTION, SOLUTION INTRAMUSCULAR; INTRAVENOUS
Status: DISCONTINUED | OUTPATIENT
Start: 2025-03-07 | End: 2025-03-07 | Stop reason: HOSPADM

## 2025-03-07 RX ORDER — PROCHLORPERAZINE EDISYLATE 5 MG/ML
5 INJECTION INTRAMUSCULAR; INTRAVENOUS EVERY 6 HOURS PRN
Status: DISCONTINUED | OUTPATIENT
Start: 2025-03-07 | End: 2025-03-07 | Stop reason: HOSPADM

## 2025-03-07 RX ORDER — ONDANSETRON 8 MG/1
8 TABLET, ORALLY DISINTEGRATING ORAL EVERY 8 HOURS PRN
Status: DISCONTINUED | OUTPATIENT
Start: 2025-03-07 | End: 2025-03-07 | Stop reason: HOSPADM

## 2025-03-07 RX ORDER — IBUPROFEN 200 MG
16 TABLET ORAL
Status: DISCONTINUED | OUTPATIENT
Start: 2025-03-07 | End: 2025-03-07 | Stop reason: HOSPADM

## 2025-03-07 RX ORDER — ALUMINUM HYDROXIDE, MAGNESIUM HYDROXIDE, AND SIMETHICONE 1200; 120; 1200 MG/30ML; MG/30ML; MG/30ML
30 SUSPENSION ORAL 4 TIMES DAILY PRN
Status: DISCONTINUED | OUTPATIENT
Start: 2025-03-07 | End: 2025-03-07 | Stop reason: HOSPADM

## 2025-03-07 RX ORDER — LOSARTAN POTASSIUM 100 MG/1
100 TABLET ORAL DAILY
Qty: 90 TABLET | Refills: 3 | Status: SHIPPED | OUTPATIENT
Start: 2025-03-07

## 2025-03-07 RX ORDER — IBUPROFEN 200 MG
24 TABLET ORAL
Status: DISCONTINUED | OUTPATIENT
Start: 2025-03-07 | End: 2025-03-07 | Stop reason: HOSPADM

## 2025-03-07 RX ORDER — SODIUM CHLORIDE 0.9 % (FLUSH) 0.9 %
10 SYRINGE (ML) INJECTION EVERY 8 HOURS
Status: DISCONTINUED | OUTPATIENT
Start: 2025-03-07 | End: 2025-03-07 | Stop reason: HOSPADM

## 2025-03-07 RX ORDER — IPRATROPIUM BROMIDE AND ALBUTEROL SULFATE 2.5; .5 MG/3ML; MG/3ML
3 SOLUTION RESPIRATORY (INHALATION) EVERY 6 HOURS PRN
Status: DISCONTINUED | OUTPATIENT
Start: 2025-03-07 | End: 2025-03-07 | Stop reason: HOSPADM

## 2025-03-07 RX ORDER — LIDOCAINE HYDROCHLORIDE 20 MG/ML
INJECTION, SOLUTION EPIDURAL; INFILTRATION; INTRACAUDAL; PERINEURAL
Status: DISCONTINUED | OUTPATIENT
Start: 2025-03-07 | End: 2025-03-07 | Stop reason: HOSPADM

## 2025-03-07 RX ORDER — ACETAMINOPHEN 325 MG/1
650 TABLET ORAL EVERY 4 HOURS PRN
Status: DISCONTINUED | OUTPATIENT
Start: 2025-03-07 | End: 2025-03-07 | Stop reason: HOSPADM

## 2025-03-07 RX ORDER — SODIUM CHLORIDE 0.9 % (FLUSH) 0.9 %
10 SYRINGE (ML) INJECTION
Status: DISCONTINUED | OUTPATIENT
Start: 2025-03-07 | End: 2025-03-07 | Stop reason: HOSPADM

## 2025-03-07 RX ORDER — MIDAZOLAM HYDROCHLORIDE 1 MG/ML
INJECTION, SOLUTION INTRAMUSCULAR; INTRAVENOUS
Status: DISCONTINUED | OUTPATIENT
Start: 2025-03-07 | End: 2025-03-07 | Stop reason: HOSPADM

## 2025-03-07 RX ORDER — NALOXONE HCL 0.4 MG/ML
0.02 VIAL (ML) INJECTION
Status: DISCONTINUED | OUTPATIENT
Start: 2025-03-07 | End: 2025-03-07 | Stop reason: HOSPADM

## 2025-03-07 RX ORDER — SODIUM CHLORIDE 9 MG/ML
INJECTION, SOLUTION INTRAVENOUS ONCE
OUTPATIENT
Start: 2025-03-07 | End: 2025-03-07

## 2025-03-07 RX ORDER — HEPARIN SOD,PORCINE/0.9 % NACL 1000/500ML
INTRAVENOUS SOLUTION INTRAVENOUS
Status: DISCONTINUED | OUTPATIENT
Start: 2025-03-07 | End: 2025-03-07 | Stop reason: HOSPADM

## 2025-03-07 RX ORDER — LOSARTAN POTASSIUM 50 MG/1
100 TABLET ORAL DAILY
Status: DISCONTINUED | OUTPATIENT
Start: 2025-03-07 | End: 2025-03-07 | Stop reason: HOSPADM

## 2025-03-07 RX ORDER — SEVELAMER CARBONATE 800 MG/1
800 TABLET, FILM COATED ORAL
Status: DISCONTINUED | OUTPATIENT
Start: 2025-03-07 | End: 2025-03-07 | Stop reason: HOSPADM

## 2025-03-07 RX ORDER — POLYETHYLENE GLYCOL 3350 17 G/17G
17 POWDER, FOR SOLUTION ORAL DAILY PRN
Status: DISCONTINUED | OUTPATIENT
Start: 2025-03-07 | End: 2025-03-07 | Stop reason: HOSPADM

## 2025-03-07 RX ORDER — ALBUTEROL SULFATE 2.5 MG/.5ML
10 SOLUTION RESPIRATORY (INHALATION)
Status: COMPLETED | OUTPATIENT
Start: 2025-03-07 | End: 2025-03-07

## 2025-03-07 RX ORDER — TALC
6 POWDER (GRAM) TOPICAL NIGHTLY PRN
Status: DISCONTINUED | OUTPATIENT
Start: 2025-03-07 | End: 2025-03-07 | Stop reason: HOSPADM

## 2025-03-07 RX ORDER — IODIXANOL 320 MG/ML
INJECTION, SOLUTION INTRAVASCULAR
Status: DISCONTINUED | OUTPATIENT
Start: 2025-03-07 | End: 2025-03-07 | Stop reason: HOSPADM

## 2025-03-07 RX ORDER — HEPARIN SODIUM 1000 [USP'U]/ML
INJECTION, SOLUTION INTRAVENOUS; SUBCUTANEOUS
Status: DISCONTINUED | OUTPATIENT
Start: 2025-03-07 | End: 2025-03-07 | Stop reason: HOSPADM

## 2025-03-07 RX ORDER — ATORVASTATIN CALCIUM 40 MG/1
40 TABLET, FILM COATED ORAL DAILY
Status: DISCONTINUED | OUTPATIENT
Start: 2025-03-08 | End: 2025-03-07 | Stop reason: HOSPADM

## 2025-03-07 RX ORDER — AMLODIPINE BESYLATE 10 MG/1
10 TABLET ORAL DAILY
Status: DISCONTINUED | OUTPATIENT
Start: 2025-03-07 | End: 2025-03-07 | Stop reason: HOSPADM

## 2025-03-07 RX ORDER — SIMETHICONE 80 MG
1 TABLET,CHEWABLE ORAL 4 TIMES DAILY PRN
Status: DISCONTINUED | OUTPATIENT
Start: 2025-03-07 | End: 2025-03-07 | Stop reason: HOSPADM

## 2025-03-07 RX ORDER — GLUCAGON 1 MG
1 KIT INJECTION
Status: DISCONTINUED | OUTPATIENT
Start: 2025-03-07 | End: 2025-03-07 | Stop reason: HOSPADM

## 2025-03-07 RX ADMIN — DEXTROSE MONOHYDRATE 50 G: 25 INJECTION, SOLUTION INTRAVENOUS at 09:03

## 2025-03-07 RX ADMIN — INSULIN HUMAN 4 UNITS: 100 INJECTION, SOLUTION PARENTERAL at 09:03

## 2025-03-07 RX ADMIN — SODIUM ZIRCONIUM CYCLOSILICATE 10 G: 10 POWDER, FOR SUSPENSION ORAL at 09:03

## 2025-03-07 RX ADMIN — ALBUTEROL SULFATE 10 MG: 2.5 SOLUTION RESPIRATORY (INHALATION) at 08:03

## 2025-03-07 RX ADMIN — AMLODIPINE BESYLATE 10 MG: 10 TABLET ORAL at 06:03

## 2025-03-07 RX ADMIN — ACETAMINOPHEN 650 MG: 325 TABLET ORAL at 02:03

## 2025-03-07 RX ADMIN — LOSARTAN POTASSIUM 100 MG: 50 TABLET, FILM COATED ORAL at 06:03

## 2025-03-07 NOTE — ED TRIAGE NOTES
Pt presents to the ED complaining of his RLE hemodialysis graft being clogged. Pt states he went to try to get his dialysis this morning, however, his graft is clogged. Pt states his last full dialysis session was Wednesday. Pt denies any other complaints at this time.

## 2025-03-07 NOTE — HPI
Glenn Collier is a 63 yo M w/ h/o HTN, HLD, CVA on plavix and ESRD 2/2 HTN on HD via RLE loop AVG created in 2013 and revised last year for aneurysm who presents to the ED with clotted graft. Patient states he went to dialysis this morning and was found to have a clotted graft. This is the first time it has clotted since revision last year. Denies pain, swelling, numbness, motor dysfunction. He is aneuric. Took plavix yesterday. Last successful dialysis treatment on Wednesday.   Patient seen and examined in the ED. He has no thrill in his graft. He last ate yesterday. Labs notable for K of 5.5. Vitals normal.

## 2025-03-07 NOTE — CONSULTS
[facility] - Emergency Dept  Nephrology  Consult Note    Patient Name: Glenn Collier  MRN: [id number]  Admission Date: 3/7/2025  Hospital Length of Stay: 0 days  Attending Provider: Travis Pro MD   Primary Care Physician: Michelle, Primary Doctor  Principal Problem:Problem with vascular access    Inpatient consult to Nephrology  Consult performed by: Katiuska Zamarripa PA-C  Consult ordered by: Travis Pro MD  Reason for consult: ESRD        Subjective:     HPI: 64-year-old male with past medical history as noted below coming in with clogged dialysis access. He feels in his usual state of health and has no physical complaints.  He went this morning to get his routine dialysis and he was unable to because the access on his right leg is clogged per the dialysis team.  Last dialysis was on Wednesday. He has no pain in the right leg at the site of his dialysis access.  No redness or swelling in the area. He denies any chest pain, shortness of breath, dizziness, weakness, abdominal pain, vomiting, diarrhea, fevers, cough.    In the ED, hypertensive but otherwise AF, HDS. CBC with Hgb 11.7, no leukocytosis. CMP with Na 139, K 5.5, CO2 21, BUN 50, Cr 12, consistent with ESRD. RLE AVG with no thrill or bruit. Vascular surgery consulted, planning for declot at noon. Nephrology consulted for HD in the setting of ESRD.    Past Medical History:   Diagnosis Date    Cancer     RCC s/p bilateral nephrectomy 2009 right 2010 left    Cardiomegaly     Dialysis patient     Encounter for blood transfusion     Glaucoma     H/O colonoscopy     Hyperlipemia     Hypertension     Renal disorder        Past Surgical History:   Procedure Laterality Date    DIALYSIS FISTULA CREATION      in arm and groin on R side     KIDNEY SURGERY      right 2009 left 2010    NEPHRECTOMY Bilateral Right 2009;  Left 2010    RCC both sides    PLACEMENT OF ARTERIOVENOUS GRAFT  7/11/2024    Procedure: INSERTION, GRAFT, ARTERIOVENOUS;  Surgeon: Adrián Damian  "MD Narciso;  Location: [facility] OR;  Service: General;;    PSEUDOANEURYSM REPAIR  7/11/2024    Procedure: REPAIR, PSEUDOANEURYSM;  Surgeon: Adrián Damian Jr., MD;  Location: [facility] OR;  Service: General;;    REVISION OF ARTERIOVENOUS FISTULA Right 7/11/2024    Procedure: REVISION, AV FISTULA / RIGHT LEG;  Surgeon: Adrián Damian Jr., MD;  Location: [facility] OR;  Service: General;  Laterality: Right;       Review of patient's allergies indicates:   Allergen Reactions    Gentamicin Hives and Nausea And Vomiting    Minoxidil Hives, Itching, Swelling, Rash and Other (See Comments)     " drops blood pressure."     Vancomycin analogues Hives and Nausea And Vomiting    Codeine Other (See Comments)    Morphine Other (See Comments)    Simvastatin Other (See Comments)     Current Facility-Administered Medications   Medication Frequency    acetaminophen tablet 650 mg Q4H PRN    albuterol-ipratropium 2.5 mg-0.5 mg/3 mL nebulizer solution 3 mL Q6H PRN    aluminum-magnesium hydroxide-simethicone 200-200-20 mg/5 mL suspension 30 mL QID PRN    dextrose 50% injection 12.5 g PRN    dextrose 50% injection 25 g PRN    dextrose 50% injection 25 g PRN    glucagon (human recombinant) injection 1 mg PRN    glucose chewable tablet 16 g PRN    glucose chewable tablet 24 g PRN    melatonin tablet 6 mg Nightly PRN    naloxone 0.4 mg/mL injection 0.02 mg PRN    ondansetron disintegrating tablet 8 mg Q8H PRN    polyethylene glycol packet 17 g Daily PRN    prochlorperazine injection Soln 5 mg Q6H PRN    simethicone chewable tablet 80 mg QID PRN    sodium chloride 0.9% flush 10 mL PRN    sodium chloride 0.9% flush 10 mL Q8H     Current Outpatient Medications   Medication    albuterol (VENTOLIN HFA) 90 mcg/actuation inhaler    amLODIPine (NORVASC) 10 MG tablet    atorvastatin (LIPITOR) 40 MG tablet    B COMPLEX & C NO.20/FOLIC ACID (NEPHROCAPS ORAL)    cinacalcet (SENSIPAR) 60 MG Tab    clopidogreL (PLAVIX) 75 mg tablet    latanoprost 0.005 % ophthalmic " solution    losartan (COZAAR) 100 MG tablet    meclizine (ANTIVERT) 25 mg tablet    sevelamer carbonate (RENVELA) 800 mg Tab    tadalafil (CIALIS) 5 MG tablet    VIRT-CAPS 1 mg Cap    vitamin D (VITAMIN D3) 1000 units Tab     Facility-Administered Medications Ordered in Other Encounters   Medication Frequency    heparin (porcine) injection Continuous     Family History       Problem Relation (Age of Onset)    Diabetes Father    Heart disease Mother, Father    Hypertension Father          Tobacco Use    Smoking status: Never    Smokeless tobacco: Never   Substance and Sexual Activity    Alcohol use: No    Drug use: No    Sexual activity: Yes     Partners: Female     Review of Systems   Constitutional:  Negative for fever.   HENT:  Negative for congestion.    Respiratory:  Negative for shortness of breath.    Cardiovascular:  Negative for chest pain.   Gastrointestinal:  Negative for nausea and vomiting.     Objective:     Vital Signs (Most Recent):  Temp: 97.5 °F (36.4 °C) (03/07/25 0934)  Pulse: 68 (03/07/25 0934)  Resp: 18 (03/07/25 0934)  BP: (!) 151/67 (03/07/25 0934)  SpO2: 99 % (03/07/25 0934) Vital Signs (24h Range):  Temp:  [97.5 °F (36.4 °C)-98.8 °F (37.1 °C)] 97.5 °F (36.4 °C)  Pulse:  [58-80] 68  Resp:  [16-18] 18  SpO2:  [98 %-99 %] 99 %  BP: (151-193)/(67-94) 151/67     Weight: 88.5 kg (195 lb) (03/07/25 0651)  Body mass index is 27.2 kg/m².  Body surface area is 2.11 meters squared.    No intake/output data recorded.     Physical Exam  Vitals and nursing note reviewed.   Constitutional:       General: He is not in acute distress.     Appearance: He is not ill-appearing.   HENT:      Head: Normocephalic.   Eyes:      Conjunctiva/sclera: Conjunctivae normal.   Cardiovascular:      Rate and Rhythm: Normal rate.      Arteriovenous access: Right arteriovenous access is present.     Comments: RLE AVG no thrill or bruit  Pulmonary:      Effort: Pulmonary effort is normal. No respiratory distress.   Abdominal:       General: There is no distension.      Palpations: Abdomen is soft.      Tenderness: There is no abdominal tenderness.   Musculoskeletal:      Right lower leg: No edema.      Left lower leg: No edema.   Skin:     General: Skin is warm and dry.   Neurological:      Mental Status: He is alert.   Psychiatric:         Mood and Affect: Mood normal.          Significant Labs:  CBC:   Recent Labs   Lab 03/07/25  0711   WBC 7.94   RBC 3.94*   HGB 11.4*   HCT 35.1*      MCV 89   MCH 28.9   MCHC 32.5     CMP:   Recent Labs   Lab 03/07/25  0711   GLU 86   CALCIUM 8.8   ALBUMIN 4.3   PROT 8.2      K 5.5*   CO2 21*      BUN 50*   CREATININE 12.4*   ALKPHOS 214*   ALT 21   AST 28   BILITOT 0.6     All labs within the past 24 hours have been reviewed.    Assessment/Plan:     Cardiac/Vascular  * Problem with vascular access  -management per primary  -vascular planning for declot at noon today  -dialysis post declot    Renal/  Chronic kidney disease with end stage renal failure on dialysis  64 year old male hx of ESRD on HD MWF presenting for malfunctioning AVG. Last HD Wednesday 3/5/25. Nephrology consulted for ESRD.     Nephrology History  -Outpatient HD unit: [facility]  -Nephrologist: [facility]  -HD tx days: MWF  -HD tx time:  4 hrs  -Last HD tx: 3/5/25  -HD access: RLE AVG  -HD modality: iHD  -Residual urine: ?  -EDW:  87 kg    Plan/Recommendations  -No current access for HD, pending declot of AVG. Mildly hyperkalemic at 5.5, s/p shifting in the ED. Recommend lokelma 10 g TID today until able to dialyze. Recommend K checks q 4 hours until able to dialyze, shift for K > 5.9 and notify nephrology. Will plan for HD this afternoon/this evening after declot.   -Will continue iHD thrice weekly while IP  -Hgb goal 10-11, hgb at goal  -Strict I&Os  -Pre & post HD weight  -Check mag, phos  -Renal diet if not NPO         Thank you for your consult. I will follow-up with patient. Please contact us if you have any  additional questions.    Katiuska Zamarripa PA-C  Nephrology  [facility] - Emergency Dept

## 2025-03-07 NOTE — ED PROVIDER NOTES
"Encounter Date: 3/7/2025       History     Chief Complaint   Patient presents with    Vascular Access Problem     Dialysis access clotted unable to receive treatment this morning.     HPI  64-year-old male with past medical history as noted below coming in with clogged dialysis access.  He feels in his usual state of health and has no physical complaints.  He went this morning to get his routine dialysis and he was unable to because the access on his right leg is clogged per the dialysis team.  Last dialysis was on Wednesday.    He has no pain in the right leg at the site of his dialysis access.  No redness or swelling in the area.    He denies any chest pain, shortness of breath, dizziness, weakness, abdominal pain, vomiting, diarrhea, fevers, cough.    Review of patient's allergies indicates:   Allergen Reactions    Gentamicin Hives and Nausea And Vomiting    Minoxidil Hives, Itching, Swelling, Rash and Other (See Comments)     " drops blood pressure."     Vancomycin analogues Hives and Nausea And Vomiting    Codeine Other (See Comments)    Morphine Other (See Comments)    Simvastatin Other (See Comments)     Past Medical History:   Diagnosis Date    Cancer     RCC s/p bilateral nephrectomy 2009 right 2010 left    Cardiomegaly     Dialysis patient     Encounter for blood transfusion     Glaucoma     H/O colonoscopy     Hyperlipemia     Hypertension     Renal disorder      Past Surgical History:   Procedure Laterality Date    DIALYSIS FISTULA CREATION      in arm and groin on R side     KIDNEY SURGERY      right 2009 left 2010    NEPHRECTOMY Bilateral Right 2009;  Left 2010    RCC both sides    PLACEMENT OF ARTERIOVENOUS GRAFT  7/11/2024    Procedure: INSERTION, GRAFT, ARTERIOVENOUS;  Surgeon: Adrián Damian Jr., MD;  Location: [facility] OR;  Service: General;;    PSEUDOANEURYSM REPAIR  7/11/2024    Procedure: REPAIR, PSEUDOANEURYSM;  Surgeon: Adrián Damian Jr., MD;  Location: [facility] OR;  Service: General;;    REVISION " OF ARTERIOVENOUS FISTULA Right 7/11/2024    Procedure: REVISION, AV FISTULA / RIGHT LEG;  Surgeon: Adrián Damian Jr., MD;  Location: [facility];  Service: General;  Laterality: Right;     Family History   Problem Relation Name Age of Onset    Heart disease Mother      Heart disease Father      Diabetes Father      Hypertension Father       Social History[1]  Review of Systems    Physical Exam     Initial Vitals [03/07/25 0646]   BP Pulse Resp Temp SpO2   (!) 193/94 80 16 98.8 °F (37.1 °C) 98 %      MAP       --         Physical Exam    Physical Exam:  CONSTITUTIONAL: Well developed, well nourished, in no acute distress.  HENT: Normocephalic, atraumatic    EYES: Sclerae anicteric   NECK: Supple, no thyroid enlargement  CARDIOVASCULAR: Regular rate and rhythm without any murmurs, gallops, rubs.  Dialysis access over the right thigh, no thrill.  No redness tenderness.  Distally the right lower extremity is well perfused with a normal DP pulse.  RESPIRATORY: Speaking in full sentences. Breathing comfortably. Auscultation of the lungs revealed normal breath sounds b/l, no wheezing, no rales, no rhonchi.  ABDOMEN: Soft and nontender, no masses, no rebound or guarding   NEUROLOGIC: Alert, interacting normally. No facial droop. Voice is clear. Speech is fluent.  MSK: Moving all four extremities.  SKIN: Warm and dry. No visible rash on exposed areas of skin.    Psych: Mood and affect normal.       ED Course   Procedures  Labs Reviewed   CBC W/ AUTO DIFFERENTIAL - Abnormal       Result Value    WBC 7.94      RBC 3.94 (*)     Hemoglobin 11.4 (*)     Hematocrit 35.1 (*)     MCV 89      MCH 28.9      MCHC 32.5      RDW 14.4      Platelets 207      MPV 10.9      Immature Granulocytes 1.0 (*)     Gran # (ANC) 5.5      Immature Grans (Abs) 0.08 (*)     Lymph # 1.2      Mono # 0.8      Eos # 0.4      Baso # 0.02      nRBC 0      Gran % 69.2      Lymph % 14.5 (*)     Mono % 9.7      Eosinophil % 5.3      Basophil % 0.3       Differential Method Automated     COMPREHENSIVE METABOLIC PANEL - Abnormal    Sodium 139      Potassium 5.5 (*)     Chloride 106      CO2 21 (*)     Glucose 86      BUN 50 (*)     Creatinine 12.4 (*)     Calcium 8.8      Total Protein 8.2      Albumin 4.3      Total Bilirubin 0.6      Alkaline Phosphatase 214 (*)     AST 28      ALT 21      eGFR 4.1 (*)     Anion Gap 12     POCT GLUCOSE    POCT Glucose 93     POCT GLUCOSE MONITORING CONTINUOUS          Imaging Results    None          Medications   dextrose 50% injection 50 g (has no administration in time range)     And   dextrose 50% injection 25 g (has no administration in time range)     And   insulin regular injection 4 Units 0.04 mL (has no administration in time range)   sodium zirconium cyclosilicate packet 10 g (has no administration in time range)   albuterol sulfate nebulizer solution 10 mg (10 mg Nebulization Given 3/7/25 0859)     Medical Decision Making  Amount and/or Complexity of Data Reviewed  Labs: ordered.    Risk  OTC drugs.  Prescription drug management.      64-year-old male with past history as noted coming in with clogged right thigh dialysis access.  He is asymptomatic.  No missed HD as he last got dialyzed Wednesday.  He is not make urine.    Will obtain labs to look for any metabolic / hematologic abnormalities.  No clinical signs of fluid overload.  No signs of infection, inflammatory changes in the leg or dialysis access.    Will order vascular ultrasound to confirm clot.  Discussed with vascular surgery and they will come and evaluate the patient for declotting.    Anticipate likely admission to Hospital Medicine under observation for vascular declotting and subsequent dialysis.               ED Course as of 03/07/25 0906   Fri Mar 07, 2025   0900 EKG, independently interpreted, sinus bradycardia at a rate of 55 with no signs of hyperkalemia, motion artifact, normal axis, Pac. [BA]      ED Course User Index  [BA] Travis Pro MD             Update:  Mildly hyperkalemic at 5:15 a.m., no concerning EKG changes.  Will shift with albuterol insulin and D50 as well as give Lokelma.  Discussed with vascular surgery who will D clots around noon.    Discussed with nephrology who will prepare for dialysis after declotting.    Will place patient in ED OU for above medical care, charge nurse notified will notify ED you team when they get here at 10:00 a.m..    Decision was made to hospitalize the patient.  Acute illness with threat to bodily function.  Discussed with hospital medicine.                     Clinical Impression:  Final diagnoses:  [Z78.9] Problem with vascular access (Primary)  [E87.5] Hyperkalemia          ED Disposition Condition    Observation Stable                    [1]   Social History  Tobacco Use    Smoking status: Never    Smokeless tobacco: Never   Substance Use Topics    Alcohol use: No    Drug use: No        Travis Pro MD  03/07/25 0907

## 2025-03-07 NOTE — Clinical Note
The catheter was inserted into the  middle right femoral AV fistula. TPA was injected and thrombectomy was performed. Catheter removed.

## 2025-03-07 NOTE — PROGRESS NOTES
03/07/25 1455   Vital Signs   Temp 97.6 °F (36.4 °C)   Temp Source Oral   Pulse 61   Heart Rate Source Monitor   Resp 18   Device (Oxygen Therapy) room air   BP (!) 188/111   MAP (mmHg) 137   BP Location Left arm   BP Method Automatic   Patient Position Lying     Received pt awake,alert and oriented.  POC discussed with the pt.  HD tx started aseptically via RLE AVG without issue.

## 2025-03-07 NOTE — H&P
ED Observation Unit  History and Physical      I assumed care of this patient from the Main ED at onset of observation time, 10:00 on 03/07/2025.       History of Present Illness:    64-year-old male with past medical history as noted below coming in with clogged dialysis access.  He feels in his usual state of health and has no physical complaints.  He went this morning to get his routine dialysis and he was unable to because the access on his right leg is clogged per the dialysis team.  Last dialysis was on Wednesday.     He has no pain in the right leg at the site of his dialysis access.  No redness or swelling in the area.     He denies any chest pain, shortness of breath, dizziness, weakness, abdominal pain, vomiting, diarrhea, fevers, cough.    I reviewed the ED Provider Note dated 3/7/25 prior to my evaluation of this patient.  I reviewed all labs and imaging performed in the Main ED, prior to patient being placed in Observation. Patient was placed in the ED Observation Unit for Problem with vascular access.    PMHx   Past Medical History:   Diagnosis Date    Cancer     RCC s/p bilateral nephrectomy 2009 right 2010 left    Cardiomegaly     Dialysis patient     Encounter for blood transfusion     Glaucoma     H/O colonoscopy     Hyperlipemia     Hypertension     Renal disorder       Past Surgical History:   Procedure Laterality Date    DIALYSIS FISTULA CREATION      in arm and groin on R side     KIDNEY SURGERY      right 2009 left 2010    NEPHRECTOMY Bilateral Right 2009;  Left 2010    RCC both sides    PLACEMENT OF ARTERIOVENOUS GRAFT  7/11/2024    Procedure: INSERTION, GRAFT, ARTERIOVENOUS;  Surgeon: Adrián Damian Jr., MD;  Location: [facility] [place] OR;  Service: General;;    PSEUDOANEURYSM REPAIR  7/11/2024    Procedure: REPAIR, PSEUDOANEURYSM;  Surgeon: Adrián Damian Jr., MD;  Location: [facility] [place] OR;  Service: General;;    REVISION OF ARTERIOVENOUS FISTULA Right 7/11/2024    Procedure: REVISION, AV FISTULA / RIGHT LEG;  " Surgeon: Adrián Damian Jr., MD;  Location: [facility];  Service: General;  Laterality: Right;        Family Hx   Family History   Problem Relation Name Age of Onset    Heart disease Mother      Heart disease Father      Diabetes Father      Hypertension Father          Social Hx   Social History     Socioeconomic History    Marital status:    Tobacco Use    Smoking status: Never    Smokeless tobacco: Never   Substance and Sexual Activity    Alcohol use: No    Drug use: No    Sexual activity: Yes     Partners: Female     Social Drivers of Health     Financial Resource Strain: Low Risk  (8/7/2024)    Overall Financial Resource Strain (CARDIA)     Difficulty of Paying Living Expenses: Not hard at all   Food Insecurity: No Food Insecurity (8/7/2024)    Hunger Vital Sign     Worried About Running Out of Food in the Last Year: Never true     Ran Out of Food in the Last Year: Never true   Transportation Needs: No Transportation Needs (8/7/2024)    TRANSPORTATION NEEDS     Transportation : No   Physical Activity: Inactive (8/7/2024)    Exercise Vital Sign     Days of Exercise per Week: 0 days     Minutes of Exercise per Session: 0 min   Stress: No Stress Concern Present (8/7/2024)    [employer] [place] [employer] - Occupational Stress Questionnaire     Feeling of Stress : Not at all   Housing Stability: Unknown (8/7/2024)    Housing Stability Vital Sign     Unable to Pay for Housing in the Last Year: No     Homeless in the Last Year: No        Vital Signs   Vitals:    03/07/25 0646 03/07/25 0651 03/07/25 0859 03/07/25 0934   BP: (!) 193/94   (!) 151/67   Pulse: 80  (!) 58 68   Resp: 16  16 18   Temp: 98.8 °F (37.1 °C)   97.5 °F (36.4 °C)   TempSrc:    Oral   SpO2: 98%  98% 99%   Weight: 88.5 kg (195 lb) 88.5 kg (195 lb)     Height: 5' 11" (1.803 m) 5' 11" (1.803 m)          Review of Systems  Review of Systems   Constitutional:  Negative for chills, fever and malaise/fatigue.   Respiratory:  Negative for " cough, shortness of breath and wheezing.    Cardiovascular:  Negative for chest pain, palpitations, orthopnea, leg swelling and PND.   Gastrointestinal:  Negative for abdominal pain, constipation, heartburn, nausea and vomiting.   Genitourinary:  Negative for hematuria.   Musculoskeletal:  Negative for falls and myalgias.   Neurological:  Negative for dizziness, loss of consciousness, weakness and headaches.       Physical Exam  Physical Exam  Vitals and nursing note reviewed.   Constitutional:       General: He is not in acute distress.     Appearance: He is not ill-appearing.   HENT:      Head: Normocephalic and atraumatic.      Nose: Nose normal.      Mouth/Throat:      Mouth: Mucous membranes are dry.   Cardiovascular:      Rate and Rhythm: Normal rate and regular rhythm.   Pulmonary:      Effort: Pulmonary effort is normal.      Breath sounds: Normal breath sounds.   Abdominal:      General: Abdomen is flat.   Musculoskeletal:         General: Normal range of motion.      Cervical back: Normal range of motion.   Skin:     General: Skin is warm and dry.      Capillary Refill: Capillary refill takes less than 2 seconds.      Comments: AVF w/o trill RLRANGEL   Neurological:      General: No focal deficit present.      Mental Status: He is alert and oriented to person, place, and time.   Psychiatric:         Mood and Affect: Mood normal.         Medications:   Scheduled Meds:   sodium chloride 0.9%  10 mL Intravenous Q8H     Continuous Infusions:  PRN Meds:.  Current Facility-Administered Medications:     acetaminophen, 650 mg, Oral, Q4H PRN    albuterol-ipratropium, 3 mL, Nebulization, Q6H PRN    aluminum-magnesium hydroxide-simethicone, 30 mL, Oral, QID PRN    dextrose 50%, 12.5 g, Intravenous, PRN    [COMPLETED] dextrose 50%, 50 g, Intravenous, Once **AND** dextrose 50%, 25 g, Intravenous, PRN **AND** [COMPLETED] insulin regular, 4 Units, Intravenous, ED 1 Time    dextrose 50%, 25 g, Intravenous, PRN    glucagon  (human recombinant), 1 mg, Intramuscular, PRN    glucose, 16 g, Oral, PRN    glucose, 24 g, Oral, PRN    melatonin, 6 mg, Oral, Nightly PRN    naloxone, 0.02 mg, Intravenous, PRN    ondansetron, 8 mg, Oral, Q8H PRN    polyethylene glycol, 17 g, Oral, Daily PRN    prochlorperazine, 5 mg, Intravenous, Q6H PRN    simethicone, 1 tablet, Oral, QID PRN    sodium chloride 0.9%, 10 mL, Intravenous, PRN      Assessment/Plan:  Vascular Access problem  CKD w/ ESRD    Creatine stable for now. BMP reviewed- noted Estimated Creatinine Clearance: 6.6 mL/min (A) (based on SCr of 12.4 mg/dL (H)). according to latest data. Monitor UOP and serial BMP and adjust therapy as needed. Renally dose meds.    -vascular consulted  -nephrology consulted  -HD MWF, last dialyzed 3/5  -Nephrology consulted  -renally dose medications  -renal diet  -avoid nephrotoxins     Case was discussed with the ED provider, Dr. Pro.

## 2025-03-07 NOTE — BRIEF OP NOTE
[facility] Lab  Brief Operative Note    Surgery Date: 3/7/2025     Surgeons and Role:     * KANA Washington III, MD - Primary     * Gary Hunt MD - Fellow    Assisting Surgeon: None    Pre-op Diagnosis:  Thrombosis R femoral AVG    Post-op Diagnosis: same    PROCEDURES:    Perc thrombectomy (10 mg TPA, possis), R femoral AVG  PTA, R femoral AVG (7x100, 9x40)  Conscious Sedation    Anesthesia: RN IV Sedation    Operative Findings: Successful thrombectomy    MAY CANNULATE    Estimated Blood Loss: <10cc         Specimens:   Specimen (24h ago, onward)      None              Discharge Note    OUTCOME: successful outpatient procedure     DISPOSITION: home after HD    FINAL DIAGNOSIS:  thrombosis AVG    FOLLOWUP: 5-10 days with AVG duplex    DISCHARGE INSTRUCTIONS:  see below

## 2025-03-07 NOTE — Clinical Note
The catheter was reinserted into the  middle right femoral AV fistula.  TPA was injected and thrombectomy was performed. Catheter removed.

## 2025-03-07 NOTE — HPI
64-year-old male with past medical history as noted below coming in with clogged dialysis access. He feels in his usual state of health and has no physical complaints.  He went this morning to get his routine dialysis and he was unable to because the access on his right leg is clogged per the dialysis team.  Last dialysis was on Wednesday. He has no pain in the right leg at the site of his dialysis access.  No redness or swelling in the area. He denies any chest pain, shortness of breath, dizziness, weakness, abdominal pain, vomiting, diarrhea, fevers, cough.    In the ED, hypertensive but otherwise AF, HDS. CBC with Hgb 11.7, no leukocytosis. CMP with Na 139, K 5.5, CO2 21, BUN 50, Cr 12, consistent with ESRD. RLE AVG with no thrill or bruit. Vascular surgery consulted, planning for declot at noon. Nephrology consulted for HD in the setting of ESRD.

## 2025-03-07 NOTE — PHARMACY MED REC
"        Admission Medication History     The home medication history was taken by Irene Mary.    You may go to "Admission" then "Reconcile Home Medications" tabs to review and/or act upon these items.     The home medication list has been updated by the Pharmacy department.   Please read ALL comments highlighted in yellow.   Please address this information as you see fit.    Feel free to contact us if you have any questions or require assistance.      The medications listed below were removed from the home medication list. Please reorder if appropriate:  Patient reports no longer taking the following medication(s):  Sensipar   Cozaar     Medications listed below were obtained from: Patient/family and Analytic software- [employer] Medications   Medication Sig    albuterol (VENTOLIN HFA) 90 mcg/actuation inhaler Inhale 2 puffs into the lungs every 4 (four) hours as needed for Wheezing or Shortness of Breath. Rescue      amLODIPine (NORVASC) 10 MG tablet Take 1 tablet (10 mg total) by mouth once daily.      atorvastatin (LIPITOR) 40 MG tablet Take 1 tablet (40 mg total) by mouth once daily      B COMPLEX & C NO.20/FOLIC ACID (NEPHROCAPS ORAL) Take 1 capsule by mouth once daily.      clopidogreL (PLAVIX) 75 mg tablet Take 1 tablet (75 mg total) by mouth once daily.      latanoprost 0.005 % ophthalmic solution 1 drop every evening.      meclizine (ANTIVERT) 25 mg tablet Take 1 tablet (25 mg total) by mouth 3 (three) times daily as needed for Dizziness.      sevelamer carbonate (RENVELA) 800 mg Tab Take 800 mg by mouth 3 (three) times daily with meals.      tadalafil (CIALIS) 5 MG tablet Take 5 mg by mouth daily as needed for Erectile Dysfunction.      VIRT-CAPS 1 mg Cap Take 1 capsule by mouth daily       vitamin D (VITAMIN D3) 1000 units Tab Take 1,000 Units by mouth once daily. Take 1 capsule daily.         Irene Mary  [id number]          .          "

## 2025-03-07 NOTE — Clinical Note
The balloon was inserted into the  middle right femoral AV fistula. Balloon was inflated and removed following angioplasty.

## 2025-03-07 NOTE — SUBJECTIVE & OBJECTIVE
"Prescriptions Prior to Admission[1]    Review of patient's allergies indicates:   Allergen Reactions    Gentamicin Hives and Nausea And Vomiting    Minoxidil Hives, Itching, Swelling, Rash and Other (See Comments)     " drops blood pressure."     Vancomycin analogues Hives and Nausea And Vomiting    Codeine Other (See Comments)    Morphine Other (See Comments)    Simvastatin Other (See Comments)       Past Medical History:   Diagnosis Date    Cancer     RCC s/p bilateral nephrectomy 2009 right 2010 left    Cardiomegaly     Dialysis patient     Encounter for blood transfusion     Glaucoma     H/O colonoscopy     Hyperlipemia     Hypertension     Renal disorder      Past Surgical History:   Procedure Laterality Date    DIALYSIS FISTULA CREATION      in arm and groin on R side     KIDNEY SURGERY      right 2009 left 2010    NEPHRECTOMY Bilateral Right 2009;  Left 2010    RCC both sides    PLACEMENT OF ARTERIOVENOUS GRAFT  7/11/2024    Procedure: INSERTION, GRAFT, ARTERIOVENOUS;  Surgeon: Adrián Damian Jr., MD;  Location: [facility];  Service: General;;    PSEUDOANEURYSM REPAIR  7/11/2024    Procedure: REPAIR, PSEUDOANEURYSM;  Surgeon: Adrián Damian Jr., MD;  Location: [facility] OR;  Service: General;;    REVISION OF ARTERIOVENOUS FISTULA Right 7/11/2024    Procedure: REVISION, AV FISTULA / RIGHT LEG;  Surgeon: Adrián Damian Jr., MD;  Location: [facility] OR;  Service: General;  Laterality: Right;     Family History       Problem Relation (Age of Onset)    Diabetes Father    Heart disease Mother, Father    Hypertension Father          Tobacco Use    Smoking status: Never    Smokeless tobacco: Never   Substance and Sexual Activity    Alcohol use: No    Drug use: No    Sexual activity: Yes     Partners: Female     Review of Systems   Constitutional: Negative.    HENT: Negative.     Eyes: Negative.    Cardiovascular: Negative.    Gastrointestinal: Negative.    Endocrine: Negative.    Genitourinary: Negative.    Musculoskeletal: " Negative.    Skin: Negative.    Allergic/Immunologic: Negative.    Neurological: Negative.    Hematological: Negative.    Psychiatric/Behavioral: Negative.     Objective:     Vital Signs (Most Recent):  Temp: 98.8 °F (37.1 °C) (03/07/25 0646)  Pulse: 80 (03/07/25 0646)  Resp: 16 (03/07/25 0646)  BP: (!) 193/94 (03/07/25 0646)  SpO2: 98 % (03/07/25 0646) Vital Signs (24h Range):  Temp:  [98.8 °F (37.1 °C)] 98.8 °F (37.1 °C)  Pulse:  [80] 80  Resp:  [16] 16  SpO2:  [98 %] 98 %  BP: (193)/(94) 193/94     Weight: 88.5 kg (195 lb)  Body mass index is 27.2 kg/m².      Physical Exam  Constitutional:       Appearance: Normal appearance.   Cardiovascular:      Rate and Rhythm: Normal rate.      Pulses:           Radial pulses are 2+ on the right side and 2+ on the left side.        Femoral pulses are 2+ on the right side.  Musculoskeletal:      Comments: RLE Loop AVG with well healed incision. No thrill or pulsatility.    Skin:     General: Skin is warm.   Neurological:      General: No focal deficit present.      Mental Status: He is alert and oriented to person, place, and time.        Significant Labs:  CBC:   Recent Labs   Lab 03/07/25  0711   WBC 7.94   RBC 3.94*   HGB 11.4*   HCT 35.1*      MCV 89   MCH 28.9   MCHC 32.5     CMP:   Recent Labs   Lab 03/07/25  0711   GLU 86   CALCIUM 8.8   ALBUMIN 4.3   PROT 8.2      K 5.5*   CO2 21*      BUN 50*   CREATININE 12.4*   ALKPHOS 214*   ALT 21   AST 28   BILITOT 0.6       Significant Diagnostics:  I have reviewed all pertinent imaging results/findings within the past 24 hours.       [1] (Not in a hospital admission)

## 2025-03-07 NOTE — Clinical Note
The catheter was inserted into the  middle right femoral AV fistula.  Inflated and removed multiple times.

## 2025-03-07 NOTE — ED NOTES
Assumed care of patient. Patient Aox4. Here because he wasn't able to get dialysis this morning due to fistula in right upper leg being clotted. Denies any pain at this time. Gets Dialyzed M/W/F

## 2025-03-07 NOTE — ASSESSMENT & PLAN NOTE
64 year old male hx of ESRD on HD MWF presenting for malfunctioning AVG. Last HD Wednesday 3/5/25. Nephrology consulted for ESRD.     Nephrology History  -Outpatient HD unit: [facility]  -Nephrologist: [facility]  -HD tx days: MWF  -HD tx time:  4 hrs  -Last HD tx: 3/5/25  -HD access: RLE AVG  -HD modality: iHD  -Residual urine: ?  -EDW:  87 kg    Plan/Recommendations  -No current access for HD, pending declot of AVG. Mildly hyperkalemic at 5.5, s/p shifting in the ED. Recommend lokelma 10 g TID today until able to dialyze. Recommend K checks q 4 hours until able to dialyze, shift for K > 5.9 and notify nephrology. Will plan for HD this afternoon/this evening after declot.   -Will continue iHD thrice weekly while IP  -Hgb goal 10-11, hgb at goal  -Strict I&Os  -Pre & post HD weight  -Check mag, phos  -Renal diet if not NPO

## 2025-03-07 NOTE — OP NOTE
[facility] - Cath Lab  Operative Note     Surgery Date: 3/7/2025      Surgeons and Role:     * KANA Washington III, MD - Primary     * Gary Hunt MD - Fellow     Assisting Surgeon: None     Pre-op Diagnosis:  Thrombosis R femoral AVG     Post-op Diagnosis: same     PROCEDURES:     Perc thrombectomy (10 mg TPA, possis), R femoral AVG  PTA, R femoral AVG (7x100, 9x40)  Conscious Sedation     Anesthesia: RN IV Sedation     Operative Findings: Successful thrombectomy     MAY CANNULATE     Estimated Blood Loss: <10cc    Indications: Mr. Monroe is a 47-year-old male with a history of end-stage renal disease on dialysis via a left forearm AV loop graft.  He had presented to dialysis where it was noted that his graft was clotted.  He presented to the ER for evaluation and a declot.  Risks and benefits of the procedure were discussed with the patient in detail including but not limited to death, pain, bleeding, pulmonary embolism, failure to be able to declot the graft, limb loss, need for further surgery.  Informed consent was signed.     Procedure in Detail:   The patient was taken to the cath lab and placed in supine position. Time out was called. The right leg was prepped and draped in sterile fashion. Lidocaine was administered in venous facing direction over planned accessed site. The graft was accessed with a micropuncture needle directed antegrade toward the outflow. Access was confirmed with the wire on fluoroscopy. 5000units of IV heparin was administered. A short 6 [place] sheath was placed. A glide wire was passed into the common femoral vein. A 7 x 100 Ultraverse balloon was passed over the wire and inflated within the venous outflow and venous anastomosis. Next an Angiojet catheter was used to deliver 10mg of TPA from the patent common femoral vein throughout the course of the loop graft. The TPA was allowed to instill within the clot and next we accessed the graft with a micropuncture needle directed  towards the inflow. The wire was passed into the superficial femoral artery and the micropuncture sheath exchanged for a short 6 [place] sheath. An over the wire anil catheter was passed into the femoral artery and inflated and retracted back to the hub of the sheath to remove the arterial plug. This maneuver was repeated. Next the angiojet was again sent through the outflow sheath over the wire now in thrombectomy mode. At this point, we had adequate outflow from sheath. A fistulogram was performed through the centrally directed sheath which showed patent graft with hemodynamically significant lesions in distal and mid graft and outflow instent stenosis.  This was then balloon with a 7x100 Ultraverse with moderate waste noted. The outflow stent was treated with a 9x40 [place] with again moderate waste noted. A fistulogram was then performed through the venous facing sheath which showed excellent flow through the graft with resolution of aforementioned stenosis. The arterial inflow was excellent without any signs of residual stenosis. Both sheaths were removed and closed with a 4-0 monocryl suture. The patient tolerated the procedure well and was transported to the post-op area for recovery.    MODERATE SEDATION IN CATH LAB   SVEN Washington MD FACS was present for moderate sedation  Dr. Washington monitored the patients cardio respiratory functions during the moderate sedation   See nurses notes for Intra-service start and end times and for the log of the name of the RN who administered the medicines for the moderate sedation, including their doseage and route.    Time of sedation:  45 mins.

## 2025-03-07 NOTE — SUBJECTIVE & OBJECTIVE
"Past Medical History:   Diagnosis Date    Cancer     RCC s/p bilateral nephrectomy 2009 right 2010 left    Cardiomegaly     Dialysis patient     Encounter for blood transfusion     Glaucoma     H/O colonoscopy     Hyperlipemia     Hypertension     Renal disorder        Past Surgical History:   Procedure Laterality Date    DIALYSIS FISTULA CREATION      in arm and groin on R side     KIDNEY SURGERY      right 2009 left 2010    NEPHRECTOMY Bilateral Right 2009;  Left 2010    RCC both sides    PLACEMENT OF ARTERIOVENOUS GRAFT  7/11/2024    Procedure: INSERTION, GRAFT, ARTERIOVENOUS;  Surgeon: Adrián Damian Jr., MD;  Location: [facility] OR;  Service: General;;    PSEUDOANEURYSM REPAIR  7/11/2024    Procedure: REPAIR, PSEUDOANEURYSM;  Surgeon: Adrián Damian Jr., MD;  Location: [facility] OR;  Service: General;;    REVISION OF ARTERIOVENOUS FISTULA Right 7/11/2024    Procedure: REVISION, AV FISTULA / RIGHT LEG;  Surgeon: Adrián Damian Jr., MD;  Location: [facility] OR;  Service: General;  Laterality: Right;       Review of patient's allergies indicates:   Allergen Reactions    Gentamicin Hives and Nausea And Vomiting    Minoxidil Hives, Itching, Swelling, Rash and Other (See Comments)     " drops blood pressure."     Vancomycin analogues Hives and Nausea And Vomiting    Codeine Other (See Comments)    Morphine Other (See Comments)    Simvastatin Other (See Comments)     Current Facility-Administered Medications   Medication Frequency    acetaminophen tablet 650 mg Q4H PRN    albuterol-ipratropium 2.5 mg-0.5 mg/3 mL nebulizer solution 3 mL Q6H PRN    aluminum-magnesium hydroxide-simethicone 200-200-20 mg/5 mL suspension 30 mL QID PRN    dextrose 50% injection 12.5 g PRN    dextrose 50% injection 25 g PRN    dextrose 50% injection 25 g PRN    glucagon (human recombinant) injection 1 mg PRN    glucose chewable tablet 16 g PRN    glucose chewable tablet 24 g PRN    melatonin tablet 6 mg Nightly PRN    naloxone 0.4 mg/mL injection 0.02 mg " PRN    ondansetron disintegrating tablet 8 mg Q8H PRN    polyethylene glycol packet 17 g Daily PRN    prochlorperazine injection Soln 5 mg Q6H PRN    simethicone chewable tablet 80 mg QID PRN    sodium chloride 0.9% flush 10 mL PRN    sodium chloride 0.9% flush 10 mL Q8H     Current Outpatient Medications   Medication    albuterol (VENTOLIN HFA) 90 mcg/actuation inhaler    amLODIPine (NORVASC) 10 MG tablet    atorvastatin (LIPITOR) 40 MG tablet    B COMPLEX & C NO.20/FOLIC ACID (NEPHROCAPS ORAL)    cinacalcet (SENSIPAR) 60 MG Tab    clopidogreL (PLAVIX) 75 mg tablet    latanoprost 0.005 % ophthalmic solution    losartan (COZAAR) 100 MG tablet    meclizine (ANTIVERT) 25 mg tablet    sevelamer carbonate (RENVELA) 800 mg Tab    tadalafil (CIALIS) 5 MG tablet    VIRT-CAPS 1 mg Cap    vitamin D (VITAMIN D3) 1000 units Tab     Facility-Administered Medications Ordered in Other Encounters   Medication Frequency    heparin (porcine) injection Continuous     Family History       Problem Relation (Age of Onset)    Diabetes Father    Heart disease Mother, Father    Hypertension Father          Tobacco Use    Smoking status: Never    Smokeless tobacco: Never   Substance and Sexual Activity    Alcohol use: No    Drug use: No    Sexual activity: Yes     Partners: Female     Review of Systems   Constitutional:  Negative for fever.   HENT:  Negative for congestion.    Respiratory:  Negative for shortness of breath.    Cardiovascular:  Negative for chest pain.   Gastrointestinal:  Negative for nausea and vomiting.     Objective:     Vital Signs (Most Recent):  Temp: 97.5 °F (36.4 °C) (03/07/25 0934)  Pulse: 68 (03/07/25 0934)  Resp: 18 (03/07/25 0934)  BP: (!) 151/67 (03/07/25 0934)  SpO2: 99 % (03/07/25 0934) Vital Signs (24h Range):  Temp:  [97.5 °F (36.4 °C)-98.8 °F (37.1 °C)] 97.5 °F (36.4 °C)  Pulse:  [58-80] 68  Resp:  [16-18] 18  SpO2:  [98 %-99 %] 99 %  BP: (151-193)/(67-94) 151/67     Weight: 88.5 kg (195 lb) (03/07/25  0651)  Body mass index is 27.2 kg/m².  Body surface area is 2.11 meters squared.    No intake/output data recorded.     Physical Exam  Vitals and nursing note reviewed.   Constitutional:       General: He is not in acute distress.     Appearance: He is not ill-appearing.   HENT:      Head: Normocephalic.   Eyes:      Conjunctiva/sclera: Conjunctivae normal.   Cardiovascular:      Rate and Rhythm: Normal rate.      Arteriovenous access: Right arteriovenous access is present.     Comments: RLE AVG no thrill or bruit  Pulmonary:      Effort: Pulmonary effort is normal. No respiratory distress.   Abdominal:      General: There is no distension.      Palpations: Abdomen is soft.      Tenderness: There is no abdominal tenderness.   Musculoskeletal:      Right lower leg: No edema.      Left lower leg: No edema.   Skin:     General: Skin is warm and dry.   Neurological:      Mental Status: He is alert.   Psychiatric:         Mood and Affect: Mood normal.          Significant Labs:  CBC:   Recent Labs   Lab 03/07/25  0711   WBC 7.94   RBC 3.94*   HGB 11.4*   HCT 35.1*      MCV 89   MCH 28.9   MCHC 32.5     CMP:   Recent Labs   Lab 03/07/25  0711   GLU 86   CALCIUM 8.8   ALBUMIN 4.3   PROT 8.2      K 5.5*   CO2 21*      BUN 50*   CREATININE 12.4*   ALKPHOS 214*   ALT 21   AST 28   BILITOT 0.6     All labs within the past 24 hours have been reviewed.

## 2025-03-07 NOTE — CONSULTS
"[facility] - Emergency Dept  Vascular Surgery  Consult Note    Inpatient consult to Vascular Surgery  Consult performed by: Susan Alas MD  Consult ordered by: Travis Pro MD  Reason for consult: Clotted AVG        Subjective:     Chief Complaint/Reason for Admission: Clotted AVG    History of Present Illness: Glenn Collier is a 63 yo M w/ h/o HTN, HLD, CVA on plavix and ESRD 2/2 HTN on HD via RLE loop AVG created in 2013 and revised last year for aneurysm who presents to the ED with clotted graft. Patient states he went to dialysis this morning and was found to have a clotted graft. This is the first time it has clotted since revision last year. Denies pain, swelling, numbness, motor dysfunction. He is aneuric. Took plavix yesterday. Last successful dialysis treatment on Wednesday.   Patient seen and examined in the ED. He has no thrill in his graft. He last ate yesterday. Labs notable for K of 5.5. Vitals normal.      Prescriptions Prior to Admission[1]    Review of patient's allergies indicates:   Allergen Reactions    Gentamicin Hives and Nausea And Vomiting    Minoxidil Hives, Itching, Swelling, Rash and Other (See Comments)     " drops blood pressure."     Vancomycin analogues Hives and Nausea And Vomiting    Codeine Other (See Comments)    Morphine Other (See Comments)    Simvastatin Other (See Comments)       Past Medical History:   Diagnosis Date    Cancer     RCC s/p bilateral nephrectomy 2009 right 2010 left    Cardiomegaly     Dialysis patient     Encounter for blood transfusion     Glaucoma     H/O colonoscopy     Hyperlipemia     Hypertension     Renal disorder      Past Surgical History:   Procedure Laterality Date    DIALYSIS FISTULA CREATION      in arm and groin on R side     KIDNEY SURGERY      right 2009 left 2010    NEPHRECTOMY Bilateral Right 2009;  Left 2010    RCC both sides    PLACEMENT OF ARTERIOVENOUS GRAFT  7/11/2024    Procedure: INSERTION, GRAFT, ARTERIOVENOUS;  Surgeon: Nati" Adrián IY Jr., MD;  Location: [facility] OR;  Service: General;;    PSEUDOANEURYSM REPAIR  7/11/2024    Procedure: REPAIR, PSEUDOANEURYSM;  Surgeon: Adrián Damian Jr., MD;  Location: [facility] OR;  Service: General;;    REVISION OF ARTERIOVENOUS FISTULA Right 7/11/2024    Procedure: REVISION, AV FISTULA / RIGHT LEG;  Surgeon: Adrián Damian Jr., MD;  Location: [facility] OR;  Service: General;  Laterality: Right;     Family History       Problem Relation (Age of Onset)    Diabetes Father    Heart disease Mother, Father    Hypertension Father          Tobacco Use    Smoking status: Never    Smokeless tobacco: Never   Substance and Sexual Activity    Alcohol use: No    Drug use: No    Sexual activity: Yes     Partners: Female     Review of Systems   Constitutional: Negative.    HENT: Negative.     Eyes: Negative.    Cardiovascular: Negative.    Gastrointestinal: Negative.    Endocrine: Negative.    Genitourinary: Negative.    Musculoskeletal: Negative.    Skin: Negative.    Allergic/Immunologic: Negative.    Neurological: Negative.    Hematological: Negative.    Psychiatric/Behavioral: Negative.     Objective:     Vital Signs (Most Recent):  Temp: 98.8 °F (37.1 °C) (03/07/25 0646)  Pulse: 80 (03/07/25 0646)  Resp: 16 (03/07/25 0646)  BP: (!) 193/94 (03/07/25 0646)  SpO2: 98 % (03/07/25 0646) Vital Signs (24h Range):  Temp:  [98.8 °F (37.1 °C)] 98.8 °F (37.1 °C)  Pulse:  [80] 80  Resp:  [16] 16  SpO2:  [98 %] 98 %  BP: (193)/(94) 193/94     Weight: 88.5 kg (195 lb)  Body mass index is 27.2 kg/m².      Physical Exam  Constitutional:       Appearance: Normal appearance.   Cardiovascular:      Rate and Rhythm: Normal rate.      Pulses:           Radial pulses are 2+ on the right side and 2+ on the left side.        Femoral pulses are 2+ on the right side.  Musculoskeletal:      Comments: RLE Loop AVG with well healed incision. No thrill or pulsatility.    Skin:     General: Skin is warm.   Neurological:      General: No focal deficit  present.      Mental Status: He is alert and oriented to person, place, and time.        Significant Labs:  CBC:   Recent Labs   Lab 03/07/25  0711   WBC 7.94   RBC 3.94*   HGB 11.4*   HCT 35.1*      MCV 89   MCH 28.9   MCHC 32.5     CMP:   Recent Labs   Lab 03/07/25  0711   GLU 86   CALCIUM 8.8   ALBUMIN 4.3   PROT 8.2      K 5.5*   CO2 21*      BUN 50*   CREATININE 12.4*   ALKPHOS 214*   ALT 21   AST 28   BILITOT 0.6       Significant Diagnostics:  I have reviewed all pertinent imaging results/findings within the past 24 hours.    Assessment/Plan:     Chronic kidney disease with end stage renal failure on dialysis  Patient presents with clotted RLE AVG. NPO since yesterday. Last dialysis on Wednesday.    - Plan for declot & possible tunneled HD catheter placement in cath lab today.  - Patient agrees with plan  - Consent signed, RLE marked.  - ED shifting K         Thank you for your consult. I will follow-up with patient. Please contact us if you have any additional questions.    Susan Alas MD  Vascular Surgery  [facility] - Emergency Dept       [1] (Not in a hospital admission)

## 2025-03-07 NOTE — Clinical Note
The balloon was inserted into the  proximal right femoral AV fistula. Balloon was inflated and removed following angioplasty.

## 2025-03-07 NOTE — ASSESSMENT & PLAN NOTE
Patient presents with clotted RLE AVG. NPO since yesterday. Last dialysis on Wednesday.    - Plan for declot & possible tunneled HD catheter placement in cath lab today.  - Patient agrees with plan  - Consent signed, RLE marked.  - ED shifting K

## 2025-03-08 LAB — POCT GLUCOSE: 159 MG/DL (ref 70–110)

## 2025-03-08 NOTE — PROGRESS NOTES
03/07/25 1825   Vital Signs   Temp 97.6 °F (36.4 °C)   Temp Source Oral   Pulse (!) 57   Heart Rate Source Monitor   Resp 16   Device (Oxygen Therapy) room air   BP (!) 157/80   MAP (mmHg) 112   BP Location Left arm   BP Method Automatic   Patient Position Lying     HD tx completed and pt tolerated well.  Tx time: 3.5hrs. Net UF: 1.6L  Pt is alert,oriented and stable.  Report given to Stefano.

## 2025-03-08 NOTE — DISCHARGE SUMMARY
ED Observation Unit  Discharge Summary        History of Present Illness:    64-year-old male with past medical history as noted below coming in with clogged dialysis access.  He feels in his usual state of health and has no physical complaints.  He went this morning to get his routine dialysis and he was unable to because the access on his right leg is clogged per the dialysis team.  Last dialysis was on Wednesday.     He has no pain in the right leg at the site of his dialysis access.  No redness or swelling in the area.     He denies any chest pain, shortness of breath, dizziness, weakness, abdominal pain, vomiting, diarrhea, fevers, cough.     I reviewed the ED Provider Note dated 3/7/25 prior to my evaluation of this patient.  I reviewed all labs and imaging performed in the Main ED, prior to patient being placed in Observation. Patient was placed in the ED Observation Unit for Problem with vascular access.    Observation Course:    Pt admitted for evaluation of clogged dialysis access. Pt afebrile and HDS. Vascular surgery was consulted for Thrombosis R femoral AVG and performed a successful thrombectomy 3/7 w/ HD following. Pt medically ready for discharge home. Pt med rec'd prior to discharge.  Pt educated on hospital course and plan, verbally agrees and understands. All questions answered.     Physical Exam  Gen: in NAD, appears stated age  Neuro: AAOx3, motor, sensory, and strength grossly intact BL  HEENT: EOMI, PERRLA; no JVD appreciated  CVS: RRR, no m/r/g  Resp: lungs CTAB, no w/r/r; no belabored breathing or accessory muscle use appreciated   Abd: NTND, soft to palpation  Extrem: no UE or LE edema BL     Consultants:    Vascular surgery  Nephrology    Final Diagnosis:  Vascular access problem    Discharge Condition: Good    Disposition: Home or Self Care     Time spent on the discharge of the patient including review of hospital course with the patient. reviewing discharge medications and arranging  follow-up care 35 minutes.  Patient was seen and examined on the date of discharge and determined to be suitable for discharge.    Follow Up:  Future Appointments   Date Time Provider Department Center   3/10/2025  6:30 AM CHAIR 03, [facility]   3/12/2025  6:30 AM CHAIR 03, [facility]   3/14/2025  6:30 AM CHAIR 03, [facility]   3/17/2025  6:30 AM CHAIR 03, [facility]   3/19/2025  6:30 AM CHAIR 03, [facility]   3/21/2025  6:30 AM CHAIR 03, [facility]   3/24/2025  6:30 AM CHAIR 03, [facility]   3/26/2025  6:30 AM CHAIR 03, [facility]   3/28/2025  6:30 AM CHAIR 03, [facility]   3/31/2025  6:30 AM CHAIR 03, [facility]   4/2/2025  6:30 AM CHAIR 03, [facility]   4/4/2025  6:30 AM CHAIR 03, [facility]   4/7/2025  6:30 AM CHAIR 03, [facility]   4/9/2025  6:30 AM CHAIR 03, [facility]   4/11/2025  6:30 AM CHAIR 03, [facility]   4/14/2025  6:30 AM CHAIR 03, [facility]   4/16/2025  6:30 AM CHAIR 03, [facility]   4/18/2025  6:30 AM CHAIR 03, [facility]   4/21/2025  6:30 AM CHAIR 03, [facility]   4/23/2025  6:30 AM CHAIR 03, [facility]   4/24/2025 11:30 AM Manuel Vides MD [id number]   4/25/2025  6:30 AM CHAIR 03, [facility]   4/28/2025  6:30 AM CHAIR 03, [facility]   4/30/2025  6:30 AM [facility]IR 03[facility]   5/2/2025  6:30 AM [facility]AIR 03, [facility]   5/5/2025  6:30 AM CHAIR 03, [facility]   5/7/2025  6:30 AM CHAIR 03, [facility]   5/9/2025  6:30 AM CHAIR 03, [facility]   5/12/2025  6:30 AM CHAIR 03, [facility]   5/14/2025  6:30 AM CHAIR 03, [facility]   5/16/2025  6:30 AM CHAIR 03, [facility]   5/19/2025  6:30 AM CHAIR 03, [facility]   5/21/2025  6:30 AM CHAIR 03, [facility]   5/23/2025  6:30 AM CHAIR 03, [facility]   5/26/2025  6:30 AM CHAIR 03, [facility]   5/28/2025  6:30 AM CHAIR 03, [facility]   5/30/2025  6:30 AM CHAIR 03, [facility]

## 2025-03-10 ENCOUNTER — TELEPHONE (OUTPATIENT)
Dept: CARDIOLOGY | Facility: CLINIC | Age: 65
End: 2025-03-10
Payer: MEDICARE

## 2025-03-10 ENCOUNTER — PATIENT OUTREACH (OUTPATIENT)
Dept: ADMINISTRATIVE | Facility: CLINIC | Age: 65
End: 2025-03-10
Payer: MEDICARE

## 2025-03-10 NOTE — TELEPHONE ENCOUNTER
Scheduled hosp f/u appt for 3/18/25.      ----- Message from Brayan sent at 3/10/2025 11:26 AM CDT -----  Contact: Patient  Type:  Patient CallWho Called: patient Does the patient know what this is regarding?: requesting a call back about having  hospital follow up appointment scheduled within 1 week ;pt was discharged on 3/7 ; please advise Would the patient rather a call back or a response via [facility]? Call Best Call Back Number: [phone] Additional Information:

## 2025-03-18 ENCOUNTER — OFFICE VISIT (OUTPATIENT)
Dept: CARDIOLOGY | Facility: CLINIC | Age: 65
End: 2025-03-18
Attending: INTERNAL MEDICINE
Payer: MEDICARE

## 2025-03-18 VITALS
BODY MASS INDEX: 27.64 KG/M2 | SYSTOLIC BLOOD PRESSURE: 120 MMHG | HEIGHT: 71 IN | OXYGEN SATURATION: 99 % | WEIGHT: 197.44 LBS | HEART RATE: 67 BPM | DIASTOLIC BLOOD PRESSURE: 69 MMHG

## 2025-03-18 DIAGNOSIS — N18.6 ANEMIA IN CHRONIC KIDNEY DISEASE, ON CHRONIC DIALYSIS: ICD-10-CM

## 2025-03-18 DIAGNOSIS — E66.3 OVERWEIGHT: ICD-10-CM

## 2025-03-18 DIAGNOSIS — Z99.2 CHRONIC KIDNEY DISEASE WITH END STAGE RENAL FAILURE ON DIALYSIS: ICD-10-CM

## 2025-03-18 DIAGNOSIS — E78.00 HYPERCHOLESTEROLEMIA: ICD-10-CM

## 2025-03-18 DIAGNOSIS — D63.1 ANEMIA IN CHRONIC KIDNEY DISEASE, ON CHRONIC DIALYSIS: ICD-10-CM

## 2025-03-18 DIAGNOSIS — I15.0 RENOVASCULAR HYPERTENSION: ICD-10-CM

## 2025-03-18 DIAGNOSIS — Z99.2 ANEMIA IN CHRONIC KIDNEY DISEASE, ON CHRONIC DIALYSIS: ICD-10-CM

## 2025-03-18 DIAGNOSIS — Z86.73 HISTORY OF CEREBROVASCULAR ACCIDENT: ICD-10-CM

## 2025-03-18 DIAGNOSIS — N18.6 CHRONIC KIDNEY DISEASE WITH END STAGE RENAL FAILURE ON DIALYSIS: ICD-10-CM

## 2025-03-18 PROCEDURE — 99213 OFFICE O/P EST LOW 20 MIN: CPT | Mod: PBBFAC | Performed by: INTERNAL MEDICINE

## 2025-03-18 PROCEDURE — 99999 PR PBB SHADOW E&M-EST. PATIENT-LVL III: CPT | Mod: PBBFAC,,, | Performed by: INTERNAL MEDICINE

## 2025-03-18 PROCEDURE — 99214 OFFICE O/P EST MOD 30 MIN: CPT | Mod: S$PBB,,, | Performed by: INTERNAL MEDICINE

## 2025-03-18 NOTE — PROGRESS NOTES
Subjective:     Glenn Collier is a 64 y.o. male with hypertension and hypercholesterolemia. He is overweight. He developed end stage renal disease due to hypertension in 1997 and began hemodialysis. Access is from a graft in the right thigh. He has undergone bilateral nephrectomies. He had a large pericardial effusion in 2006 that resolved with intensified dialysis. He receives erythropoietin shots about every two weeks. 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 He once had a very light stroke. He denies any exertional chest pain or exertional dyspnea. He denies any claudication. No palpitations or weak spells. No bleeding. No issues with any of his prescribed medications. Feeling well overall.      Hypertension  This is a chronic problem. The current episode started more than 1 year ago. The problem is unchanged. The problem is controlled (usually 120-130/60-80 mmHg at home). Pertinent negatives include no anxiety, blurred vision, chest pain, headaches, malaise/fatigue, neck pain, orthopnea, palpitations, peripheral edema, PND, shortness of breath or sweats. Identifiable causes of hypertension include chronic renal disease.   Hyperlipidemia  This is a chronic problem. The current episode started more than 1 year ago. The problem is controlled. Recent lipid tests were reviewed and are normal. Exacerbating diseases include chronic renal disease. He has no history of diabetes, hypothyroidism, liver disease, obesity or nephrotic syndrome. Pertinent negatives include no chest pain, focal sensory loss, focal weakness, leg pain, myalgias or shortness of breath.   Cerebrovascular Accident  Pertinent negatives include no chest pain, chills, coughing, fever, headaches, myalgias, nausea, neck pain, numbness, rash, vertigo, vomiting or weakness.       Review of Systems   Constitutional: Negative for chills, fever and malaise/fatigue.   HENT:  Negative for nosebleeds.     Eyes:  Negative for blurred vision, double vision, vision loss in left eye and vision loss in right eye.   Cardiovascular:  Negative for chest pain, claudication, dyspnea on exertion, irregular heartbeat, leg swelling, near-syncope, orthopnea, palpitations, paroxysmal nocturnal dyspnea and syncope.   Respiratory:  Negative for cough, hemoptysis, shortness of breath and wheezing.    Endocrine: Negative for cold intolerance and heat intolerance.   Hematologic/Lymphatic: Negative for bleeding problem. Does not bruise/bleed easily.   Skin:  Negative for color change and rash.   Musculoskeletal:  Negative for back pain, falls, muscle weakness, myalgias and neck pain.   Gastrointestinal:  Negative for heartburn, hematemesis, hematochezia, hemorrhoids, jaundice, melena, nausea and vomiting.   Neurological:  Negative for dizziness, focal weakness, headaches, light-headedness, loss of balance, numbness, tremors, vertigo and weakness.   Psychiatric/Behavioral:  Negative for altered mental status, depression and memory loss. The patient is not nervous/anxious.    Allergic/Immunologic: Negative for hives and persistent infections.       Current Outpatient Medications on File Prior to Visit   Medication Sig Dispense Refill    albuterol (VENTOLIN HFA) 90 mcg/actuation inhaler Inhale 2 puffs into the lungs every 4 (four) hours as needed for Wheezing or Shortness of Breath. Rescue      atorvastatin (LIPITOR) 40 MG tablet Take 1 tablet (40 mg total) by mouth once daily. 90 tablet 3    clopidogreL (PLAVIX) 75 mg tablet Take 1 tablet (75 mg total) by mouth once daily. 90 tablet 3    latanoprost 0.005 % ophthalmic solution 1 drop every evening.      losartan (COZAAR) 100 MG tablet Take 1 tablet (100 mg total) by mouth once daily. 90 tablet 3    sevelamer carbonate (RENVELA) 800 mg Tab Take 800 mg by mouth 3 (three) times daily with meals.      VIRT-CAPS 1 mg Cap Take 1 capsule by mouth.      vitamin D (VITAMIN D3) 1000 units Tab Take  "1,000 Units by mouth once daily. Take 1 capsule daily.      B COMPLEX & C NO.20/FOLIC ACID (NEPHROCAPS ORAL) Take 1 capsule by mouth once daily. (Patient not taking: Reported on 3/18/2025)      meclizine (ANTIVERT) 25 mg tablet Take 1 tablet (25 mg total) by mouth 3 (three) times daily as needed for Dizziness. (Patient not taking: Reported on 3/18/2025) 30 tablet 0    tadalafil (CIALIS) 5 MG tablet Take 5 mg by mouth daily as needed for Erectile Dysfunction. (Patient not taking: Reported on 3/18/2025)       Current Facility-Administered Medications on File Prior to Visit   Medication Dose Route Frequency Provider Last Rate Last Admin    heparin (porcine) injection  500 Units/hr Intravenous Continuous Steve Garg DO 0.5 mL/hr at 03/15/24 0602 500 Units/hr at 03/15/24 0602    [DISCONTINUED] epoetin gabrielle injection 6,000 Units  6,000 Units Subcutaneous Every Mon Steve Garg DO   6,000 Units at 03/17/25 0600       /69   Pulse 67   Ht 5' 11" (1.803 m)   Wt 89.5 kg (197 lb 6.8 oz)   SpO2 99%   BMI 27.53 kg/m²      Objective:     Physical Exam  Constitutional:       General: He is not in acute distress.     Appearance: Normal appearance. He is well-developed. He is not ill-appearing, toxic-appearing or diaphoretic.   HENT:      Head: Normocephalic and atraumatic.      Nose: Nose normal.   Eyes:      General:         Right eye: No discharge.         Left eye: No discharge.      Conjunctiva/sclera:      Right eye: Right conjunctiva is not injected.      Left eye: Left conjunctiva is not injected.      Pupils: Pupils are equal.      Right eye: Pupil is round.      Left eye: Pupil is round.   Neck:      Thyroid: No thyromegaly.      Vascular: No carotid bruit or JVD.   Cardiovascular:      Rate and Rhythm: Normal rate and regular rhythm. No extrasystoles are present.     Chest Wall: PMI is not displaced.      Pulses:           Radial pulses are 2+ on the right side and 2+ on the left side.        Femoral " pulses are 2+ on the right side and 2+ on the left side.       Dorsalis pedis pulses are 1+ on the right side and 1+ on the left side.        Posterior tibial pulses are 1+ on the right side and 1+ on the left side.      Heart sounds: S1 normal and S2 normal. Murmur heard.      Harsh midsystolic murmur is present at the upper right sternal border. Graft right upper thigh.  Multiple fistulas and scars right arm.  No edema of arms.      Gallop present. S4 sounds present.      Comments: Graft right upper thigh.  Pulmonary:      Effort: Pulmonary effort is normal.      Breath sounds: Normal breath sounds.   Abdominal:      Palpations: Abdomen is soft.      Tenderness: There is no abdominal tenderness.   Musculoskeletal:      Cervical back: Neck supple.      Right ankle: No swelling, deformity or ecchymosis.      Left ankle: No swelling, deformity or ecchymosis.   Lymphadenopathy:      Head:      Right side of head: No submandibular adenopathy.      Left side of head: No submandibular adenopathy.      Cervical: No cervical adenopathy.   Skin:     General: Skin is warm and dry.      Findings: No rash.      Nails: There is no clubbing.   Neurological:      General: No focal deficit present.      Mental Status: He is alert and oriented to person, place, and time. He is not disoriented.      Cranial Nerves: No cranial nerve deficit.      Sensory: No sensory deficit.   Psychiatric:         Attention and Perception: Attention and perception normal.         Mood and Affect: Mood and affect normal.         Speech: Speech normal.         Behavior: Behavior normal.         Thought Content: Thought content normal.         Cognition and Memory: Cognition and memory normal.         Judgment: Judgment normal.       Assessment:     1. History of cerebrovascular accident    2. Renovascular hypertension    3. Hypercholesterolemia    4. Overweight    5. Chronic kidney disease with end stage renal failure on dialysis    6. Anemia in  chronic kidney disease, on chronic dialysis        Plan:     History of Cerebrovascular Accident   Tells me once was told he had a light stroke. No sequela.   4/19/2023: Aspirin 81 mg Q24 was discontinued.   On clopidogrel 75 mg Q24.   No bleeding.     2. Hypertension   1997: Diagnosed with severe hypertension.   2024: Amlodipine 10 mg Q24 was discontinued due to low readings.   On losartan 100 mg Q24.   Keeping log at home.   Appears well controlled.    3. Hypercholesterolemia   1985: Began statin.   On atorvastatin 20 mg Q24.   6/17/2016: Chol 109. HDL 31. LDL 43. .   2/20/2023: Chol 122. HDL 49. LDL 46. .   On atorvastatin 40 mg Q24.   1/2/2025: Chol 123. HDL 54. LDL 53. TG 80.   On atorvastatin 40 mg Q24   Very favorable lipid panel.    4. Overweight   4/19/2021: Weight 93 kg. BMI 29.   10/22/2024: Weight 89 kg. BMI 27.   Near optimal weight.    5. End Stage Kidney Disease on Dialysis   9/18/1997: Began dialysis. [facility]   Dr. Steve Garg.     6. Anemia   Due to end stage renal disease and nephrectomies.   Receiving erythropoietin injections about every two weeks.    7. [facility]   Dr. Steve Garg.    F/u 6 months.    Manuel Vides M.D.

## 2025-05-16 ENCOUNTER — TELEPHONE (OUTPATIENT)
Dept: VASCULAR SURGERY | Facility: CLINIC | Age: 65
End: 2025-05-16
Payer: MEDICARE

## 2025-05-16 DIAGNOSIS — Z99.2 ESRD (END STAGE RENAL DISEASE) ON DIALYSIS: Primary | ICD-10-CM

## 2025-05-16 DIAGNOSIS — N18.6 ESRD (END STAGE RENAL DISEASE) ON DIALYSIS: Primary | ICD-10-CM

## 2025-05-16 NOTE — TELEPHONE ENCOUNTER
Attempted to contact pt to schedule FU with Dr. Washington. Voice messages left for pt requesting return call to schedule.  ----- Message from KANA Washington MD sent at 3/7/2025  1:22 PM CST -----  F/u 5-10 days with AVG duplex

## 2025-07-07 ENCOUNTER — TELEPHONE (OUTPATIENT)
Dept: VASCULAR SURGERY | Facility: CLINIC | Age: 65
End: 2025-07-07
Payer: MEDICARE

## 2025-07-07 NOTE — TELEPHONE ENCOUNTER
Attempted to contact the pt to schedule f/u appt but no answer.Left a voice message with a call back number [phone] to schedule appt.

## 2025-07-09 ENCOUNTER — TELEPHONE (OUTPATIENT)
Dept: VASCULAR SURGERY | Facility: CLINIC | Age: 65
End: 2025-07-09
Payer: MEDICARE

## 2025-07-09 NOTE — TELEPHONE ENCOUNTER
Spoke with the pt and f/u appt rescheduled.Pt verbalized understanding of information received and all questions answered.  Copied from CRM #[id number]. Topic: General Inquiry - Return Call  >> Jul 9, 2025 10:15 AM Kitty wrote:  Returning a Missed Call    Caller: pt    Returning call to: Keyla Soria LPN    Caller can be reached @:[phone]    Nature of the call:  schedule f/u appt    Please Call to Advise,Thank you.

## 2025-07-15 ENCOUNTER — HOSPITAL ENCOUNTER (OUTPATIENT)
Dept: VASCULAR SURGERY | Facility: CLINIC | Age: 65
Discharge: HOME | End: 2025-07-15
Attending: SURGERY
Payer: MEDICARE

## 2025-07-15 ENCOUNTER — OFFICE VISIT (OUTPATIENT)
Dept: VASCULAR SURGERY | Facility: CLINIC | Age: 65
End: 2025-07-15
Payer: MEDICARE

## 2025-07-15 VITALS
DIASTOLIC BLOOD PRESSURE: 90 MMHG | BODY MASS INDEX: 27.78 KG/M2 | TEMPERATURE: 98 F | WEIGHT: 198.44 LBS | HEIGHT: 71 IN | SYSTOLIC BLOOD PRESSURE: 146 MMHG | HEART RATE: 77 BPM

## 2025-07-15 DIAGNOSIS — N18.6 ESRD (END STAGE RENAL DISEASE) ON DIALYSIS: ICD-10-CM

## 2025-07-15 DIAGNOSIS — Z99.2 ESRD (END STAGE RENAL DISEASE) ON DIALYSIS: ICD-10-CM

## 2025-07-15 DIAGNOSIS — Z99.2 ESRD (END STAGE RENAL DISEASE) ON DIALYSIS: Primary | ICD-10-CM

## 2025-07-15 DIAGNOSIS — N18.6 ESRD (END STAGE RENAL DISEASE) ON DIALYSIS: Primary | ICD-10-CM

## 2025-07-15 PROCEDURE — 93990 DOPPLER FLOW TESTING: CPT | Mod: 26,S$PBB,, | Performed by: SURGERY

## 2025-07-15 PROCEDURE — 99214 OFFICE O/P EST MOD 30 MIN: CPT | Mod: S$PBB,,, | Performed by: SURGERY

## 2025-07-15 PROCEDURE — 99213 OFFICE O/P EST LOW 20 MIN: CPT | Mod: PBBFAC,25 | Performed by: SURGERY

## 2025-07-15 PROCEDURE — 99999 PR PBB SHADOW E&M-EST. PATIENT-LVL III: CPT | Mod: PBBFAC,,, | Performed by: SURGERY

## 2025-07-15 PROCEDURE — 93990 DOPPLER FLOW TESTING: CPT | Mod: PBBFAC | Performed by: SURGERY

## 2025-07-15 NOTE — PROGRESS NOTES
VASCULAR SURGERY SERVICE    CHIEF COMPLAINT: ESRD    HISTORY OF PRESENT ILLNESS: Glenn Collier is a 64 y.o. male with end-stage renal disease status post:    Percutaneous declot, right femoral AV graft 03/07/2025 (Dr. Washington), [place]10[place] of [place]  Revision, right femoral AV graft proximally 2 years ago (Dr. Damian)  Original right femoral AV graft placement 2013    He has had no trouble using the graft since the last declot 2 months ago.  He is currently on Plavix only.  Dialysis unit was concerned about the swelling over the medial aspect adjacent to the graft.  The patient states this has been there ever since the surgical revision greater than a year ago    Past Medical History:   Diagnosis Date    Cancer     RCC s/p bilateral nephrectomy 2009 right 2010 left    Cardiomegaly     Dialysis patient     Encounter for blood transfusion     Glaucoma     H/O colonoscopy     Hyperlipemia     Hypertension     Renal disorder        Past Surgical History:   Procedure Laterality Date    DIALYSIS FISTULA CREATION      in arm and groin on R side     FISTULOGRAM N/A 3/7/2025    Procedure: Fistulogram;  Surgeon: KANA Washington III, MD;  Location: [facility] CATH LAB;  Service: Peripheral Vascular;  Laterality: N/A;    KIDNEY SURGERY      right 2009 left 2010    NEPHRECTOMY Bilateral Right 2009;  Left 2010    RCC both sides    PERCUTANEOUS TRANSLUMINAL ANGIOPLASTY OF ARTERIOVENOUS FISTULA N/A 3/7/2025    Procedure: PTA, AV FISTULA;  Surgeon: KANA Washington III, MD;  Location: [facility] CATH LAB;  Service: Peripheral Vascular;  Laterality: N/A;    PLACEMENT OF ARTERIOVENOUS GRAFT  7/11/2024    Procedure: INSERTION, GRAFT, ARTERIOVENOUS;  Surgeon: Adrián Damian Jr., MD;  Location: [facility] OR;  Service: General;;    PSEUDOANEURYSM REPAIR  7/11/2024    Procedure: REPAIR, PSEUDOANEURYSM;  Surgeon: Adrián Damian Jr., MD;  Location: [facility] OR;  Service: General;;    REVISION OF ARTERIOVENOUS FISTULA Right 7/11/2024    Procedure: REVISION,  "AV FISTULA / RIGHT LEG;  Surgeon: Adrián Damian Jr., MD;  Location: [facility] OR;  Service: General;  Laterality: Right;    THROMBECTOMY OF HEMODIALYSIS ACCESS SITE N/A 3/7/2025    Procedure: THROMBECTOMY, HEMODIALYSIS GRAFT OR FISTULA;  Surgeon: KANA Washington III, MD;  Location: [facility] CATH LAB;  Service: Peripheral Vascular;  Laterality: N/A;       Current Medications[1]    Review of patient's allergies indicates:   Allergen Reactions    Gentamicin Hives and Nausea And Vomiting    Minoxidil Hives, Itching, Swelling, Rash and Other (See Comments)     " drops blood pressure."     Vancomycin analogues Hives and Nausea And Vomiting    Codeine Other (See Comments)    Morphine Other (See Comments)    Simvastatin Other (See Comments)       Family History   Problem Relation Name Age of Onset    Heart disease Mother      Heart disease Father      Diabetes Father      Hypertension Father         Social History[2]    PHYSICAL EXAM:   BP (!) 146/90 (BP Location: Left arm, Patient Position: Sitting)   Pulse 77   Temp 97.7 °F (36.5 °C) (Oral)   Ht 5' 11" (1.803 m)   Wt 90 kg (198 lb 6.6 oz)   BMI 27.67 kg/m²   Constitutional:  Alert,   Well-appearing  In no distress.   Neurological: Normal speech  no focal findings  CN II - XII grossly intact.    Psychiatric: Mood and affect appropriate and symmetric.   HEENT: Normocephalic / atraumatic  PERRLA  Midline trachea  No scars across the neck   Cardiac: Regular rate and rhythm.   Pulmonary: Normal pulmonary effort.   Abdomen: Soft, not distended.     Skin: Warm and well perfused.    Vascular:  Radial pulses 2+ bilaterally   Extremities/  Musculoskeletal: No edema.   Right lower extremity demonstrates a quite long loop femoral AV graft with a soft thrill absolutely no pulsatility.  The medial aspect around the apex there is a non pulsatile mass with a proximally 3 cm   07/15/2025  IMAGING:  Duplex of the graft showed to be patent with a moderate mid graft stenosis with a velocity of " 515, however robust flow volume of 1.8 L    No flow is seen in the adjacent structure consistent with a seroma    The declot fistulogram films from 3 2025 were personally reviewed.  There is a stent the venous anastomosis that had significant InStent stenosis which was effectively treated    IMPRESSION:  Functional right femoral AV graft as detailed above.  While there is a moderate mid graft stenosis the flow volume is robust at 1.8 L  The swelling seen medially is not in continuity with the current AVG    PLAN:  Observe   Continue Plavix  Follow up in 6 months with AV graft duplex sooner if he has any clinical issues with his graft    WBarbara Washington III, MD, FACS  Professor and Chief, Vascular and Endovascular Surgery           [1]   Current Outpatient Medications:     albuterol (VENTOLIN HFA) 90 mcg/actuation inhaler, Inhale 2 puffs into the lungs every 4 (four) hours as needed for Wheezing or Shortness of Breath. Rescue, Disp: , Rfl:     atorvastatin (LIPITOR) 40 MG tablet, Take 1 tablet (40 mg total) by mouth once daily., Disp: 90 tablet, Rfl: 3    cinacalcet (SENSIPAR) 60 MG Tab, Take 60 mg by mouth Daily., Disp: , Rfl:     clopidogreL (PLAVIX) 75 mg tablet, Take 1 tablet (75 mg total) by mouth once daily., Disp: 90 tablet, Rfl: 3    latanoprost 0.005 % ophthalmic solution, 1 drop every evening., Disp: , Rfl:     losartan (COZAAR) 100 MG tablet, Take 1 tablet (100 mg total) by mouth once daily., Disp: 90 tablet, Rfl: 3    meclizine (ANTIVERT) 25 mg tablet, Take 1 tablet (25 mg total) by mouth 3 (three) times daily as needed for Dizziness. (Patient not taking: Reported on 3/18/2025), Disp: 30 tablet, Rfl: 0    methylPREDNISolone (MEDROL DOSEPACK) 4 mg tablet, use as directed, Disp: 21 each, Rfl: 0    sevelamer carbonate (RENVELA) 800 mg Tab, Take 800 mg by mouth 3 (three) times daily with meals., Disp: , Rfl:     tadalafil (CIALIS) 5 MG tablet, Take 5 mg by mouth daily as needed for Erectile  Dysfunction. (Patient not taking: Reported on 3/18/2025), Disp: , Rfl:     VIRT-CAPS 1 mg Cap, Take 1 capsule by mouth once daily., Disp: 90 capsule, Rfl: 3    vitamin D (VITAMIN D3) 1000 units Tab, Take 1,000 Units by mouth once daily. Take 1 capsule daily., Disp: , Rfl:   No current facility-administered medications for this visit.    Facility-Administered Medications Ordered in Other Visits:     heparin (porcine) injection, 500 Units/hr, Intravenous, Continuous, Steve Garg DO, Last Rate: 0.5 mL/hr at 03/15/24 0602, 500 Units/hr at 03/15/24 0602  [2]   Social History  Tobacco Use    Smoking status: Never    Smokeless tobacco: Never   Substance Use Topics    Alcohol use: No    Drug use: No

## (undated) DEVICE — ELECTRODE REM PLYHSV RETURN 9

## (undated) DEVICE — SUT POLY CV-6 30 TT-13

## (undated) DEVICE — GOWN SMART IMP BREATHABLE XXLG

## (undated) DEVICE — LOOP VES MAXI BLU 2.5X1MM 18IN

## (undated) DEVICE — CATH ULTRAVERSE 035 7X100X75

## (undated) DEVICE — TOWEL OR XRAY BLUE 17X26IN

## (undated) DEVICE — SOL POVIDONE SCRUB IODINE 4 OZ

## (undated) DEVICE — SYR 10CC LUER LOCK

## (undated) DEVICE — CATH ANGIOJET PROXI-90CM

## (undated) DEVICE — CLOSURE SKIN STERI STRIP 1/2X4

## (undated) DEVICE — SPONGE COTTON TRAY 4X4IN

## (undated) DEVICE — SUT VICRYL PLUS 3-0 SH 18IN

## (undated) DEVICE — GUIDEWIRE STF .035X180CM ANG

## (undated) DEVICE — SHEATH PINNACLE INTRO .035 4FR

## (undated) DEVICE — KIT MICROINTRO 4F .018X40X7CM

## (undated) DEVICE — CATH FOGARTY THRU-LUMENARTERIA

## (undated) DEVICE — VISIPAQUE CONTRAST 320MG/100ML

## (undated) DEVICE — ELECTRODE BLD EXT INSUL 1

## (undated) DEVICE — GLOVE SENSICARE PI MICRO 8

## (undated) DEVICE — BANDAGE ROLL COTTN 4.5INX4.1YD

## (undated) DEVICE — TIP YANKAUERS BULB NO VENT

## (undated) DEVICE — SYR LL 3ML 23G 1.5IN

## (undated) DEVICE — CANNULA VSL W/DUCKBILL

## (undated) DEVICE — SPONGE LAP 18X18 PREWASHED

## (undated) DEVICE — JELLY SURGILUBE 5GR

## (undated) DEVICE — SYR SLIP TIP 1CC

## (undated) DEVICE — SUT VICRYL PLUS 3-0 18IN

## (undated) DEVICE — SUT MONOCRYL PLUS UD 3-0 27

## (undated) DEVICE — SUT MCRYL PLUS 4-0 PS2 27IN

## (undated) DEVICE — HEMOSTAT SURGICEL 4X8IN

## (undated) DEVICE — BANDAGE MATRIX HK LOOP 4IN 5YD

## (undated) DEVICE — APPLICATOR CHLORAPREP ORN 26ML

## (undated) DEVICE — DECANTER FLUID TRNSF WHITE 9IN

## (undated) DEVICE — SUT VICRYL 2-0 36 CT-1

## (undated) DEVICE — CATH DORADO BLLN DIL 6F 9X4X80

## (undated) DEVICE — SYR 30CC LUER LOCK

## (undated) DEVICE — TRAY CATH LAB OMC

## (undated) DEVICE — APPLIER CLIP LIAGCLIP 9.375IN

## (undated) DEVICE — APPLIER LIGACLIP SM 9.38IN

## (undated) DEVICE — SHEATH INTRODUCER 5FR 10CM

## (undated) DEVICE — DRAPE PED LAP SURG 108X77IN

## (undated) DEVICE — ADHESIVE DERMABOND ADVANCED

## (undated) DEVICE — SOL NORMAL USPCA 0.9%

## (undated) DEVICE — KIT POWER PULSE

## (undated) DEVICE — GUIDEWIRE ADVNTG 018X180CM ANG

## (undated) DEVICE — PACK UPPER EXTREMITY BAPTIST

## (undated) DEVICE — BOOT SUTURE AID

## (undated) DEVICE — GLOVE SENSICARE PI MICRO 6.5

## (undated) DEVICE — SHEATH PINNACLE 6FR HIFLO

## (undated) DEVICE — UNDERPAD ULTRASORB 300LB 30X36

## (undated) DEVICE — BLADE SURG STAINLESS STEEL #11

## (undated) DEVICE — NDL HYPO REG 25G X 1 1/2

## (undated) DEVICE — INFLATOR ENCORE 26 BLLN INFL